# Patient Record
Sex: FEMALE | Race: WHITE | NOT HISPANIC OR LATINO | Employment: OTHER | URBAN - METROPOLITAN AREA
[De-identification: names, ages, dates, MRNs, and addresses within clinical notes are randomized per-mention and may not be internally consistent; named-entity substitution may affect disease eponyms.]

---

## 2017-02-20 PROBLEM — Z98.890 H/O ABDOMINOPLASTY: Status: ACTIVE | Noted: 2017-02-20

## 2017-02-20 PROBLEM — Z29.89 OSTEOPOROSIS PROPHYLAXIS: Status: ACTIVE | Noted: 2017-02-20

## 2017-02-20 PROBLEM — M46.40 DISCITIS: Status: ACTIVE | Noted: 2017-02-20

## 2017-02-20 PROBLEM — Z29.8 OSTEOPOROSIS PROPHYLAXIS: Status: ACTIVE | Noted: 2017-02-20

## 2017-02-20 PROBLEM — R30.0 DYSURIA: Status: ACTIVE | Noted: 2017-02-20

## 2017-02-24 ENCOUNTER — OFFICE VISIT (OUTPATIENT)
Dept: GYNECOLOGIC ONCOLOGY | Facility: CLINIC | Age: 52
End: 2017-02-24

## 2017-02-24 VITALS
HEART RATE: 73 BPM | SYSTOLIC BLOOD PRESSURE: 139 MMHG | RESPIRATION RATE: 16 BRPM | TEMPERATURE: 98.3 F | WEIGHT: 181 LBS | DIASTOLIC BLOOD PRESSURE: 79 MMHG | BODY MASS INDEX: 27.52 KG/M2 | OXYGEN SATURATION: 100 %

## 2017-02-24 DIAGNOSIS — N81.11 MIDLINE CYSTOCELE: Primary | ICD-10-CM

## 2017-02-24 DIAGNOSIS — Z87.39 HISTORY OF OSTEOMYELITIS: ICD-10-CM

## 2017-02-24 DIAGNOSIS — N81.6 RECTOCELE: ICD-10-CM

## 2017-02-24 PROCEDURE — 99213 OFFICE O/P EST LOW 20 MIN: CPT | Performed by: NURSE PRACTITIONER

## 2017-02-24 NOTE — PROGRESS NOTES
"GYN ONCOLOGY FOLLOW-UP    Whit Davis  [id number]  1965    Chief Complaint: Follow-up (prolapse )        History of present illness:  Whit Davis is a 51 y.o. year old female who is here today for c/o recurrent prolapse. She initiated care with our group during an inpatient hospital stay with diagnosis of osteomyelitis at L5S1 following an ASC performed at [facility]. She recovered well after long-term antibiotics managed by ID and as of 2015 CT scan revealed improvement of pelvic findings. She chose to continue her care with our office and was doing well as of her annual exam in 2016.   She presents today with report of prolapse returning, which she first noticed 2-3 months ago. Since that time it has been progressively worsening and she is now feeling tissue protruding through the vagina. She states \"this is worse than it was before my last surgery.\" She has anxiety about this condition due to the complications she had previously. She denies fecal incontinence and only has urinary incontinence when her bladder is allowed to become very full. She reports she has gotten in the habit of emptying very frequently to avoid leaking and avoid increased vaginal pressure. She feels tissue bulging out of the vagina after standing on her feet for an extended period of time and each time she sits to use the restroom. 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 She has questions on how to proceed next.       Past Medical History   Diagnosis Date   • GERD (gastroesophageal reflux disease)    • Internal hemorrhoids    • Meningitis    • MRSA (methicillin resistant Staphylococcus aureus)    • Osteomyelitis      L5S1 following ASC   • RA (rheumatoid arthritis)    • Viral meningitis        Past Surgical History   Procedure Laterality Date   • Abdominoplasty     • Hemorrhoidectomy     •  section       x1   • " Robotic assisted hysterectomy       RTLH/BSO for benign uterine tumor   • Sacrocolpopexy       abdominal approach   • Oophorectomy     • Cholecystectomy N/A 9/22/2016     Procedure: CHOLECYSTECTOMY LAPAROSCOPIC, WITH IOC;  Surgeon: Yaakov Resendez MD;  Location: [facility];  Service:    • Other surgical history  06/2015     Bladder surgery with suspecison and repair   • Hysterectomy       total abdominal       MEDICATIONS: The current medication list was reviewed and reconciled.     Allergies:  is allergic to bactrim [sulfamethoxazole-trimethoprim].    Family History   Problem Relation Age of Onset   • Heart disease Mother    • BRCA 1/2 Neg Hx    • Breast cancer Neg Hx    • Ovarian cancer Neg Hx        Review of Systems   Constitutional: Negative for appetite change, chills, fatigue, fever and unexpected weight change.   Respiratory: Negative for cough, shortness of breath and wheezing.    Cardiovascular: Negative for chest pain, palpitations and leg swelling.   Gastrointestinal: Negative for abdominal distention, abdominal pain, blood in stool, constipation, diarrhea, nausea and vomiting.   Endocrine: Negative.    Genitourinary:        See HPI   Musculoskeletal: Negative for arthralgias, gait problem and joint swelling.   Neurological: Negative for dizziness, seizures, syncope, weakness, light-headedness, numbness and headaches.   Hematological: Negative for adenopathy.   Psychiatric/Behavioral: Negative.        Physical Exam  Vital Signs: Visit Vitals   • /79   • Pulse 73   • Temp 98.3 °F (36.8 °C) (Temporal Artery )   • Resp 16   • Wt 181 lb (82.1 kg)   • SpO2 100%   • BMI 27.52 kg/m2      General Appearance:  alert, cooperative, no apparent distress, appears stated age and normal weight   Neurologic/Psychiatric: A&O x 3, gait steady, appropriate affect   HEENT:  Normocephalic, without obvious abnormality, mucous membranes moist   Abdomen:   Soft, non-tender, non-distended and no organomegaly   Lymph  nodes: No cervical, supraclavicular, inguinal or axillary adenopathy noted   Extremities: Normal, atraumatic; no clubbing, cyanosis, or edema    Pelvic: External Genitalia  without lesions or skin changes  Vagina  is pink, moist, without lesions.  and reveals significant prolapse.  Prior to speculum exam, marked bulging of tissues can be seen with valsalva. Vaginal side walls are relatively well supported. Grade 3 rectocele and grade 3 cystocele both appreciated upon single valve speculum exam. This worsen with valsalva.   Vaginal Cuff  Female Vaginal Cuff: smooth, intact, without visible lesions and cuff is relatively well supported despite prolapses  Uterus  surgically absent  Ovaries  without palpable masses or fullness  Parametria  smooth  Rectovaginal  Female rectovaginal: deferred and perineal body is intact       Procedure Notes:  No notes on file    Assessment and Plan:    Whit was seen today for follow-up.    Diagnoses and all orders for this visit:    Midline cystocele    Rectocele    History of osteomyelitis        Whit and I discussed my findings upon her physical exam are consistent with her report. We discussed conservative (pessary) management vs. Repeat surgical management. Pessary management would likely be temporary and she would likely require Gellhorn type or similar for rectocele support. These can be more difficult to remove and manage at home. She declines this option at this time.   She is most interested in a more permanent solution, although she has anxiety about repeating surgery. To our knowledge there has never been a posterior repair and this is a major component of her current symptoms. Informed her I will speak with Dr. Salazar and Dr. Ruiz about possible surgical repair. I will call her early next week to notify her of our discussion. If they agree, we will bring her back into clinic soon for surgical consultation. She agrees to this plan and v/u.       Joytsna Woodard  ELI Hernandez      Note: Speech recognition transcription software was used to dictate portions of this document.  An attempt at proofreading has been made though minor errors in transcription may still be present.  Please do not hesitate to call our office with any questions.

## 2017-02-27 ENCOUNTER — OFFICE VISIT (OUTPATIENT)
Dept: GYNECOLOGIC ONCOLOGY | Facility: CLINIC | Age: 52
End: 2017-02-27

## 2017-02-27 ENCOUNTER — PREP FOR SURGERY (OUTPATIENT)
Dept: GYNECOLOGIC ONCOLOGY | Facility: CLINIC | Age: 52
End: 2017-02-27

## 2017-02-27 VITALS
RESPIRATION RATE: 16 BRPM | WEIGHT: 182 LBS | HEART RATE: 77 BPM | HEIGHT: 68 IN | BODY MASS INDEX: 27.58 KG/M2 | OXYGEN SATURATION: 98 % | DIASTOLIC BLOOD PRESSURE: 80 MMHG | TEMPERATURE: 98.6 F | SYSTOLIC BLOOD PRESSURE: 156 MMHG

## 2017-02-27 DIAGNOSIS — N81.6 RECTOCELE: Primary | ICD-10-CM

## 2017-02-27 DIAGNOSIS — N81.6 PROCTOCELE: Primary | ICD-10-CM

## 2017-02-27 PROCEDURE — 99212 OFFICE O/P EST SF 10 MIN: CPT | Performed by: OBSTETRICS & GYNECOLOGY

## 2017-02-27 RX ORDER — PREGABALIN 25 MG/1
150 CAPSULE ORAL ONCE
Status: CANCELLED | OUTPATIENT
Start: 2017-02-27 | End: 2017-02-27

## 2017-02-27 RX ORDER — ACETAMINOPHEN 325 MG/1
650 TABLET ORAL ONCE
Status: CANCELLED | OUTPATIENT
Start: 2017-02-27 | End: 2017-02-27

## 2017-02-27 RX ORDER — SODIUM CHLORIDE 0.9 % (FLUSH) 0.9 %
1-10 SYRINGE (ML) INJECTION AS NEEDED
Status: CANCELLED | OUTPATIENT
Start: 2017-02-27

## 2017-02-27 NOTE — PROGRESS NOTES
Progress note:  Patient is a 51-year-old white female who comes in today for evaluation of vaginal wall prolapse.  Significant interval past history is a prior abdominal sacral colpopexy done elsewhere that resulted in sacral osteomyelitis.  This has been treated and she is asymptomatic at this point.  Significant is over the last several months she has noticed increased vaginal bulging through the introitus currently as she stands throughout the day teaching school.  This is getting to the point where it is affecting her daily activities.  She relates that she does have some more urinary frequency but no particular unless she allows her bladder to become overfilled.  She so Jyotsna on 2/24/17 and she noted what she felt to be a significant loss of vaginal wall support.  She reports that this symptomatology is progressive.    Pelvic examination:  External: With Valsalva there is separation of the introitus with bulging of the vaginal mucosa out past the hymeneal ring  Vagina: The vaginal mucosa is intact.  There is no excoriation there is no bleeding there is no discharge.  Evaluation of the vaginal  support reveals a well suspended anterior vaginal wall with no real cystocele.  The urethrovesical angle is well supported.  Posteriorly however there is a grade 3 loss of posterior wall support in the midline starting in the distal third and extending up the proximal third of the posterior vaginal wall.  This is seems to be consistent with a pure rectocele.    Impression:  Grade 3 midline lower vagina rectocele in an otherwise healthy woman.    Plan:  Posterior rectocele repair tentatively scheduled 3/9/2017

## 2017-02-28 ENCOUNTER — PREP FOR SURGERY (OUTPATIENT)
Dept: GYNECOLOGIC ONCOLOGY | Facility: CLINIC | Age: 52
End: 2017-02-28

## 2017-02-28 NOTE — PATIENT INSTRUCTIONS
Gynecologic Oncology  Inpatient Pre-op Patient Education  *See checked boxes for your instructions*    Patient Name:  Whit Davis  [id number]  1965    Surgeon:  Dr. Salazar    Appointment  [x]  1. Your surgery has been scheduled on 3-9-17. You will need to be at the second floor surgery registration of the [facility] hospital on that day at 6:30 AM.   [x] 2.  You have a pre-admission testing (PAT) appointment for labs and possibly chest xray and EKG, on 3-8-17 at 4:30 PM.  You will need to be at hospital registration on the first floor, 10 minutes before that time.  If your PAT appointment is on Sunday, please enter through the Emergency Department.   [x] 3.  The hospital registration department is located in the long hallway between the [address] buildings.     The Day(s) Before Surgery  [x] 1. On 3-8-17, the day prior to surgery, you will need to follow a clear liquid diet which consists of apple, grape, or cranberry juice, coffee, tea, Jell-O, broth, popsicles, and pina. NO MILK, CREAM, OR ORANGE JUICE.  No solid food after midnight on 3-7-17.   You may have sips of clear fluids up until two-three hours prior to your arrival to the hospital on the morning of surgery.    [] 2.  You will need to use a mild laxative at least once (either in the morning or afternoon) the day prior to surgery to clean out your bowels.  You can purchase Miralax over-the-counter at the pharmacy and follow the directions on the back.      [x] 3.  Do not take vitamins or full dose aspirin one week before surgery.  If you normally take a blood thinning medication such as Warfarin, Eliquis, or Xarelto, we will give you specific instructions regarding these medications and we may need to talk with your other doctors.   [x] 4.  On the morning of your surgery, you may likely take your routine prescription medications with a sip of water as reviewed with you by your surgeon.  Bring your home medications with you to the hospital as we  may need to reference these.   In particular be sure to bring any inhalers.     Post-surgery Instructions  [x] 1.  A normal length of stay for your type of surgery is approximately 2-3 hospital nights.  All rooms are private, so family member may stay with you.     [x] 2.  Do not take your own home prescription medication while you are in the hospital unless otherwise instructed.  These will be provided to you.         Comments:

## 2017-03-07 ENCOUNTER — APPOINTMENT (OUTPATIENT)
Dept: PREADMISSION TESTING | Facility: HOSPITAL | Age: 52
End: 2017-03-07

## 2017-03-08 ENCOUNTER — ANESTHESIA EVENT (OUTPATIENT)
Dept: PERIOP | Facility: HOSPITAL | Age: 52
End: 2017-03-08

## 2017-03-08 ENCOUNTER — APPOINTMENT (OUTPATIENT)
Dept: PREADMISSION TESTING | Facility: HOSPITAL | Age: 52
End: 2017-03-08

## 2017-03-08 ENCOUNTER — TELEPHONE (OUTPATIENT)
Dept: GYNECOLOGIC ONCOLOGY | Facility: CLINIC | Age: 52
End: 2017-03-08

## 2017-03-08 VITALS — WEIGHT: 182.76 LBS | HEIGHT: 68 IN | BODY MASS INDEX: 27.7 KG/M2

## 2017-03-08 DIAGNOSIS — N81.6 RECTOCELE: ICD-10-CM

## 2017-03-08 LAB
ALBUMIN SERPL-MCNC: 4.3 G/DL (ref 3.2–4.8)
ALBUMIN/GLOB SERPL: 1.7 G/DL (ref 1.5–2.5)
ALP SERPL-CCNC: 65 U/L (ref 25–100)
ALT SERPL W P-5'-P-CCNC: 34 U/L (ref 7–40)
ANION GAP SERPL CALCULATED.3IONS-SCNC: 4 MMOL/L (ref 3–11)
AST SERPL-CCNC: 47 U/L (ref 0–33)
BASOPHILS # BLD AUTO: 0.04 10*3/MM3 (ref 0–0.2)
BASOPHILS NFR BLD AUTO: 0.8 % (ref 0–1)
BILIRUB SERPL-MCNC: 1.4 MG/DL (ref 0.3–1.2)
BUN BLD-MCNC: 14 MG/DL (ref 9–23)
BUN/CREAT SERPL: 17.5 (ref 7–25)
CALCIUM SPEC-SCNC: 9.8 MG/DL (ref 8.7–10.4)
CHLORIDE SERPL-SCNC: 104 MMOL/L (ref 99–109)
CO2 SERPL-SCNC: 32 MMOL/L (ref 20–31)
CREAT BLD-MCNC: 0.8 MG/DL (ref 0.6–1.3)
DEPRECATED RDW RBC AUTO: 50.2 FL (ref 37–54)
EOSINOPHIL # BLD AUTO: 0.17 10*3/MM3 (ref 0.1–0.3)
EOSINOPHIL NFR BLD AUTO: 3.4 % (ref 0–3)
ERYTHROCYTE [DISTWIDTH] IN BLOOD BY AUTOMATED COUNT: 14.8 % (ref 11.3–14.5)
GFR SERPL CREATININE-BSD FRML MDRD: 75 ML/MIN/1.73
GLOBULIN UR ELPH-MCNC: 2.5 GM/DL
GLUCOSE BLD-MCNC: 90 MG/DL (ref 70–100)
HBA1C MFR BLD: 5 % (ref 4.8–5.6)
HCT VFR BLD AUTO: 37.7 % (ref 34.5–44)
HGB BLD-MCNC: 12.5 G/DL (ref 11.5–15.5)
IMM GRANULOCYTES # BLD: 0.01 10*3/MM3 (ref 0–0.03)
IMM GRANULOCYTES NFR BLD: 0.2 % (ref 0–0.6)
LYMPHOCYTES # BLD AUTO: 1.59 10*3/MM3 (ref 0.6–4.8)
LYMPHOCYTES NFR BLD AUTO: 32.2 % (ref 24–44)
MCH RBC QN AUTO: 30.3 PG (ref 27–31)
MCHC RBC AUTO-ENTMCNC: 33.2 G/DL (ref 32–36)
MCV RBC AUTO: 91.5 FL (ref 80–99)
MONOCYTES # BLD AUTO: 0.44 10*3/MM3 (ref 0–1)
MONOCYTES NFR BLD AUTO: 8.9 % (ref 0–12)
MRSA DNA SPEC QL NAA+PROBE: NEGATIVE
NEUTROPHILS # BLD AUTO: 2.69 10*3/MM3 (ref 1.5–8.3)
NEUTROPHILS NFR BLD AUTO: 54.5 % (ref 41–71)
PLATELET # BLD AUTO: 203 10*3/MM3 (ref 150–450)
PMV BLD AUTO: 9.6 FL (ref 6–12)
POTASSIUM BLD-SCNC: 4.2 MMOL/L (ref 3.5–5.5)
PROT SERPL-MCNC: 6.8 G/DL (ref 5.7–8.2)
RBC # BLD AUTO: 4.12 10*6/MM3 (ref 3.89–5.14)
SODIUM BLD-SCNC: 140 MMOL/L (ref 132–146)
WBC NRBC COR # BLD: 4.94 10*3/MM3 (ref 3.5–10.8)

## 2017-03-08 PROCEDURE — 93010 ELECTROCARDIOGRAM REPORT: CPT | Performed by: INTERNAL MEDICINE

## 2017-03-08 RX ORDER — VANCOMYCIN HYDROCHLORIDE
15 ONCE
Status: SHIPPED | OUTPATIENT
Start: 2017-03-08

## 2017-03-08 RX ORDER — AZITHROMYCIN 250 MG/1
TABLET, FILM COATED ORAL
COMMUNITY
Start: 2017-02-21 | End: 2017-03-08

## 2017-03-08 RX ORDER — PANTOPRAZOLE SODIUM 40 MG/1
TABLET, DELAYED RELEASE ORAL
Refills: 3 | COMMUNITY
Start: 2017-01-09 | End: 2017-03-08

## 2017-03-08 NOTE — PAT
Change pre-op antibiotic to vancomycin per harleen (office) for HX of MRSA- order for MRSA PCR also obtained from Harleen and collected in PAT

## 2017-03-08 NOTE — TELEPHONE ENCOUNTER
Received call from Whit with [facility] PAT Dept. Pt is scheduled for surgery tomorrow with Dr. Salazar. States pt has hx of MRSA, wants to know if Dr. Salazar wants to change abx to Vancomycin.  Instructed her per Dr. Salazar verbal order to do so and do MRSA swab.  Jacqueline hurtado/malena and will do as instructed.

## 2017-03-09 ENCOUNTER — HOSPITAL ENCOUNTER (OUTPATIENT)
Facility: HOSPITAL | Age: 52
Discharge: HOME | End: 2017-03-10
Attending: OBSTETRICS & GYNECOLOGY | Admitting: OBSTETRICS & GYNECOLOGY

## 2017-03-09 ENCOUNTER — ANESTHESIA (OUTPATIENT)
Dept: PERIOP | Facility: HOSPITAL | Age: 52
End: 2017-03-09

## 2017-03-09 DIAGNOSIS — N81.6 RECTOCELE: ICD-10-CM

## 2017-03-09 PROCEDURE — 25010000002 PROPOFOL 10 MG/ML EMULSION: Performed by: NURSE ANESTHETIST, CERTIFIED REGISTERED

## 2017-03-09 PROCEDURE — 25010000002 DEXAMETHASONE PER 1 MG: Performed by: NURSE ANESTHETIST, CERTIFIED REGISTERED

## 2017-03-09 PROCEDURE — 25010000002 NEOSTIGMINE 10 MG/10ML SOLUTION: Performed by: NURSE ANESTHETIST, CERTIFIED REGISTERED

## 2017-03-09 PROCEDURE — 94799 UNLISTED PULMONARY SVC/PX: CPT

## 2017-03-09 PROCEDURE — 57268 REPAIR OF BOWEL BULGE: CPT | Performed by: OBSTETRICS & GYNECOLOGY

## 2017-03-09 PROCEDURE — 25010000002 FENTANYL CITRATE (PF) 100 MCG/2ML SOLUTION: Performed by: NURSE ANESTHETIST, CERTIFIED REGISTERED

## 2017-03-09 PROCEDURE — G0378 HOSPITAL OBSERVATION PER HR: HCPCS

## 2017-03-09 PROCEDURE — 25010000002 DIPHENHYDRAMINE PER 50 MG: Performed by: OBSTETRICS & GYNECOLOGY

## 2017-03-09 PROCEDURE — 25010000002 VANCOMYCIN HCL IN NACL 1.25-0.9 GM/250ML-% SOLUTION: Performed by: OBSTETRICS & GYNECOLOGY

## 2017-03-09 PROCEDURE — 25010000002 ONDANSETRON PER 1 MG: Performed by: NURSE ANESTHETIST, CERTIFIED REGISTERED

## 2017-03-09 PROCEDURE — S0260 H&P FOR SURGERY: HCPCS | Performed by: OBSTETRICS & GYNECOLOGY

## 2017-03-09 RX ORDER — PROPOFOL 10 MG/ML
VIAL (ML) INTRAVENOUS AS NEEDED
Status: DISCONTINUED | OUTPATIENT
Start: 2017-03-09 | End: 2017-03-09 | Stop reason: SURG

## 2017-03-09 RX ORDER — SODIUM CHLORIDE 9 MG/ML
INJECTION, SOLUTION INTRAVENOUS AS NEEDED
Status: DISCONTINUED | OUTPATIENT
Start: 2017-03-09 | End: 2017-03-09 | Stop reason: HOSPADM

## 2017-03-09 RX ORDER — GLYCOPYRROLATE 0.2 MG/ML
INJECTION INTRAMUSCULAR; INTRAVENOUS AS NEEDED
Status: DISCONTINUED | OUTPATIENT
Start: 2017-03-09 | End: 2017-03-09 | Stop reason: SURG

## 2017-03-09 RX ORDER — FAMOTIDINE 20 MG/1
20 TABLET, FILM COATED ORAL ONCE
Status: COMPLETED | OUTPATIENT
Start: 2017-03-09 | End: 2017-03-09

## 2017-03-09 RX ORDER — FENTANYL CITRATE 50 UG/ML
50 INJECTION, SOLUTION INTRAMUSCULAR; INTRAVENOUS
Status: DISCONTINUED | OUTPATIENT
Start: 2017-03-09 | End: 2017-03-09 | Stop reason: HOSPADM

## 2017-03-09 RX ORDER — IBUPROFEN 600 MG/1
600 TABLET ORAL EVERY 6 HOURS PRN
Status: DISCONTINUED | OUTPATIENT
Start: 2017-03-09 | End: 2017-03-10 | Stop reason: HOSPADM

## 2017-03-09 RX ORDER — PROMETHAZINE HYDROCHLORIDE 25 MG/1
25 SUPPOSITORY RECTAL ONCE AS NEEDED
Status: DISCONTINUED | OUTPATIENT
Start: 2017-03-09 | End: 2017-03-09 | Stop reason: HOSPADM

## 2017-03-09 RX ORDER — LABETALOL HYDROCHLORIDE 5 MG/ML
5 INJECTION, SOLUTION INTRAVENOUS
Status: DISCONTINUED | OUTPATIENT
Start: 2017-03-09 | End: 2017-03-09 | Stop reason: HOSPADM

## 2017-03-09 RX ORDER — PROMETHAZINE HYDROCHLORIDE 25 MG/1
25 TABLET ORAL ONCE AS NEEDED
Status: DISCONTINUED | OUTPATIENT
Start: 2017-03-09 | End: 2017-03-09 | Stop reason: HOSPADM

## 2017-03-09 RX ORDER — ROCURONIUM BROMIDE 10 MG/ML
INJECTION, SOLUTION INTRAVENOUS AS NEEDED
Status: DISCONTINUED | OUTPATIENT
Start: 2017-03-09 | End: 2017-03-09 | Stop reason: SURG

## 2017-03-09 RX ORDER — LIDOCAINE HYDROCHLORIDE 10 MG/ML
1 INJECTION, SOLUTION EPIDURAL; INFILTRATION; INTRACAUDAL; PERINEURAL ONCE
Status: COMPLETED | OUTPATIENT
Start: 2017-03-09 | End: 2017-03-09

## 2017-03-09 RX ORDER — OXYCODONE HYDROCHLORIDE 5 MG/1
5 TABLET ORAL EVERY 4 HOURS PRN
Status: DISCONTINUED | OUTPATIENT
Start: 2017-03-09 | End: 2017-03-10 | Stop reason: HOSPADM

## 2017-03-09 RX ORDER — FAMOTIDINE 10 MG/ML
20 INJECTION, SOLUTION INTRAVENOUS ONCE
Status: DISCONTINUED | OUTPATIENT
Start: 2017-03-09 | End: 2017-03-09

## 2017-03-09 RX ORDER — CLINDAMYCIN PHOSPHATE 300 MG/50ML
300 INJECTION INTRAVENOUS ONCE
Status: COMPLETED | OUTPATIENT
Start: 2017-03-09 | End: 2017-03-09

## 2017-03-09 RX ORDER — PANTOPRAZOLE SODIUM 40 MG/1
40 TABLET, DELAYED RELEASE ORAL DAILY
Status: DISCONTINUED | OUTPATIENT
Start: 2017-03-09 | End: 2017-03-10 | Stop reason: HOSPADM

## 2017-03-09 RX ORDER — LIDOCAINE HYDROCHLORIDE 10 MG/ML
INJECTION, SOLUTION EPIDURAL; INFILTRATION; INTRACAUDAL; PERINEURAL AS NEEDED
Status: DISCONTINUED | OUTPATIENT
Start: 2017-03-09 | End: 2017-03-09 | Stop reason: SURG

## 2017-03-09 RX ORDER — SODIUM CHLORIDE, SODIUM LACTATE, POTASSIUM CHLORIDE, CALCIUM CHLORIDE 600; 310; 30; 20 MG/100ML; MG/100ML; MG/100ML; MG/100ML
9 INJECTION, SOLUTION INTRAVENOUS CONTINUOUS
Status: DISCONTINUED | OUTPATIENT
Start: 2017-03-09 | End: 2017-03-09 | Stop reason: HOSPADM

## 2017-03-09 RX ORDER — PROMETHAZINE HYDROCHLORIDE 25 MG/ML
6.25 INJECTION, SOLUTION INTRAMUSCULAR; INTRAVENOUS ONCE AS NEEDED
Status: DISCONTINUED | OUTPATIENT
Start: 2017-03-09 | End: 2017-03-09 | Stop reason: HOSPADM

## 2017-03-09 RX ORDER — ACETAMINOPHEN 325 MG/1
650 TABLET ORAL ONCE
Status: COMPLETED | OUTPATIENT
Start: 2017-03-09 | End: 2017-03-09

## 2017-03-09 RX ORDER — HYDROXYCHLOROQUINE SULFATE 200 MG/1
200 TABLET, FILM COATED ORAL 2 TIMES DAILY
Status: DISCONTINUED | OUTPATIENT
Start: 2017-03-09 | End: 2017-03-10 | Stop reason: HOSPADM

## 2017-03-09 RX ORDER — SODIUM CHLORIDE 0.9 % (FLUSH) 0.9 %
1-10 SYRINGE (ML) INJECTION AS NEEDED
Status: DISCONTINUED | OUTPATIENT
Start: 2017-03-09 | End: 2017-03-09 | Stop reason: HOSPADM

## 2017-03-09 RX ORDER — SODIUM CHLORIDE 0.9 % (FLUSH) 0.9 %
1-10 SYRINGE (ML) INJECTION AS NEEDED
Status: DISCONTINUED | OUTPATIENT
Start: 2017-03-09 | End: 2017-03-09

## 2017-03-09 RX ORDER — ONDANSETRON 2 MG/ML
4 INJECTION INTRAMUSCULAR; INTRAVENOUS ONCE AS NEEDED
Status: DISCONTINUED | OUTPATIENT
Start: 2017-03-09 | End: 2017-03-09 | Stop reason: HOSPADM

## 2017-03-09 RX ORDER — ONDANSETRON 2 MG/ML
INJECTION INTRAMUSCULAR; INTRAVENOUS AS NEEDED
Status: DISCONTINUED | OUTPATIENT
Start: 2017-03-09 | End: 2017-03-09 | Stop reason: SURG

## 2017-03-09 RX ORDER — HYDROCODONE BITARTRATE AND ACETAMINOPHEN 5; 325 MG/1; MG/1
1 TABLET ORAL ONCE AS NEEDED
Status: DISCONTINUED | OUTPATIENT
Start: 2017-03-09 | End: 2017-03-09 | Stop reason: HOSPADM

## 2017-03-09 RX ORDER — VANCOMYCIN HYDROCHLORIDE
15 ONCE
Status: DISCONTINUED | OUTPATIENT
Start: 2017-03-09 | End: 2017-03-09 | Stop reason: HOSPADM

## 2017-03-09 RX ORDER — FENTANYL CITRATE 50 UG/ML
INJECTION, SOLUTION INTRAMUSCULAR; INTRAVENOUS AS NEEDED
Status: DISCONTINUED | OUTPATIENT
Start: 2017-03-09 | End: 2017-03-09 | Stop reason: SURG

## 2017-03-09 RX ORDER — MAGNESIUM HYDROXIDE 1200 MG/15ML
LIQUID ORAL AS NEEDED
Status: DISCONTINUED | OUTPATIENT
Start: 2017-03-09 | End: 2017-03-09 | Stop reason: HOSPADM

## 2017-03-09 RX ORDER — DIPHENHYDRAMINE HYDROCHLORIDE 50 MG/ML
25 INJECTION INTRAMUSCULAR; INTRAVENOUS ONCE
Status: COMPLETED | OUTPATIENT
Start: 2017-03-09 | End: 2017-03-09

## 2017-03-09 RX ORDER — SODIUM CHLORIDE, SODIUM LACTATE, POTASSIUM CHLORIDE, CALCIUM CHLORIDE 600; 310; 30; 20 MG/100ML; MG/100ML; MG/100ML; MG/100ML
50 INJECTION, SOLUTION INTRAVENOUS CONTINUOUS
Status: DISCONTINUED | OUTPATIENT
Start: 2017-03-09 | End: 2017-03-10 | Stop reason: HOSPADM

## 2017-03-09 RX ORDER — NEOSTIGMINE METHYLSULFATE 1 MG/ML
INJECTION, SOLUTION INTRAVENOUS AS NEEDED
Status: DISCONTINUED | OUTPATIENT
Start: 2017-03-09 | End: 2017-03-09 | Stop reason: SURG

## 2017-03-09 RX ORDER — PREGABALIN 75 MG/1
150 CAPSULE ORAL ONCE
Status: COMPLETED | OUTPATIENT
Start: 2017-03-09 | End: 2017-03-09

## 2017-03-09 RX ORDER — BUPIVACAINE HYDROCHLORIDE AND EPINEPHRINE 5; 5 MG/ML; UG/ML
INJECTION, SOLUTION PERINEURAL AS NEEDED
Status: DISCONTINUED | OUTPATIENT
Start: 2017-03-09 | End: 2017-03-09 | Stop reason: HOSPADM

## 2017-03-09 RX ORDER — ONDANSETRON 4 MG/1
4 TABLET, FILM COATED ORAL EVERY 6 HOURS PRN
Status: DISCONTINUED | OUTPATIENT
Start: 2017-03-09 | End: 2017-03-10 | Stop reason: HOSPADM

## 2017-03-09 RX ORDER — ONDANSETRON 2 MG/ML
4 INJECTION INTRAMUSCULAR; INTRAVENOUS EVERY 6 HOURS PRN
Status: DISCONTINUED | OUTPATIENT
Start: 2017-03-09 | End: 2017-03-10 | Stop reason: HOSPADM

## 2017-03-09 RX ORDER — DEXAMETHASONE SODIUM PHOSPHATE 4 MG/ML
INJECTION, SOLUTION INTRA-ARTICULAR; INTRALESIONAL; INTRAMUSCULAR; INTRAVENOUS; SOFT TISSUE AS NEEDED
Status: DISCONTINUED | OUTPATIENT
Start: 2017-03-09 | End: 2017-03-09 | Stop reason: SURG

## 2017-03-09 RX ORDER — NALOXONE HCL 0.4 MG/ML
0.4 VIAL (ML) INJECTION AS NEEDED
Status: DISCONTINUED | OUTPATIENT
Start: 2017-03-09 | End: 2017-03-09 | Stop reason: HOSPADM

## 2017-03-09 RX ORDER — DOCUSATE SODIUM 100 MG/1
200 CAPSULE, LIQUID FILLED ORAL 2 TIMES DAILY
Status: DISCONTINUED | OUTPATIENT
Start: 2017-03-09 | End: 2017-03-10 | Stop reason: HOSPADM

## 2017-03-09 RX ADMIN — OXYCODONE HYDROCHLORIDE 5 MG: 5 TABLET ORAL at 20:59

## 2017-03-09 RX ADMIN — PROPOFOL 200 MG: 10 INJECTION, EMULSION INTRAVENOUS at 08:41

## 2017-03-09 RX ADMIN — OXYCODONE HYDROCHLORIDE 5 MG: 5 TABLET ORAL at 13:23

## 2017-03-09 RX ADMIN — PREGABALIN 150 MG: 75 CAPSULE ORAL at 07:00

## 2017-03-09 RX ADMIN — HYDROXYCHLOROQUINE SULFATE 200 MG: 200 TABLET, FILM COATED ORAL at 13:22

## 2017-03-09 RX ADMIN — FAMOTIDINE 20 MG: 20 TABLET ORAL at 07:00

## 2017-03-09 RX ADMIN — ACETAMINOPHEN 650 MG: 325 TABLET, FILM COATED ORAL at 07:00

## 2017-03-09 RX ADMIN — DOCUSATE SODIUM 200 MG: 100 CAPSULE, LIQUID FILLED ORAL at 18:00

## 2017-03-09 RX ADMIN — LIDOCAINE HYDROCHLORIDE 30 MG: 10 INJECTION, SOLUTION EPIDURAL; INFILTRATION; INTRACAUDAL; PERINEURAL at 08:41

## 2017-03-09 RX ADMIN — CLINDAMYCIN PHOSPHATE 300 MG: 6 INJECTION, SOLUTION INTRAVENOUS at 08:34

## 2017-03-09 RX ADMIN — DIPHENHYDRAMINE HYDROCHLORIDE 25 MG: 50 INJECTION INTRAMUSCULAR; INTRAVENOUS at 08:26

## 2017-03-09 RX ADMIN — PANTOPRAZOLE SODIUM 40 MG: 40 TABLET, DELAYED RELEASE ORAL at 12:28

## 2017-03-09 RX ADMIN — ROBINUL 0.4 MG: 0.2 INJECTION INTRAMUSCULAR; INTRAVENOUS at 09:36

## 2017-03-09 RX ADMIN — LIDOCAINE HYDROCHLORIDE 0.2 ML: 10 INJECTION, SOLUTION EPIDURAL; INFILTRATION; INTRACAUDAL; PERINEURAL at 06:55

## 2017-03-09 RX ADMIN — SODIUM CHLORIDE, POTASSIUM CHLORIDE, SODIUM LACTATE AND CALCIUM CHLORIDE 50 ML/HR: 600; 310; 30; 20 INJECTION, SOLUTION INTRAVENOUS at 15:26

## 2017-03-09 RX ADMIN — DEXAMETHASONE SODIUM PHOSPHATE 8 MG: 4 INJECTION, SOLUTION INTRAMUSCULAR; INTRAVENOUS at 08:57

## 2017-03-09 RX ADMIN — HYDROXYCHLOROQUINE SULFATE 200 MG: 200 TABLET, FILM COATED ORAL at 20:51

## 2017-03-09 RX ADMIN — DOCUSATE SODIUM 200 MG: 100 CAPSULE, LIQUID FILLED ORAL at 12:28

## 2017-03-09 RX ADMIN — SODIUM CHLORIDE, POTASSIUM CHLORIDE, SODIUM LACTATE AND CALCIUM CHLORIDE 9 ML/HR: 600; 310; 30; 20 INJECTION, SOLUTION INTRAVENOUS at 06:55

## 2017-03-09 RX ADMIN — FENTANYL CITRATE 100 MCG: 50 INJECTION, SOLUTION INTRAMUSCULAR; INTRAVENOUS at 08:41

## 2017-03-09 RX ADMIN — ROCURONIUM BROMIDE 30 MG: 10 INJECTION INTRAVENOUS at 08:41

## 2017-03-09 RX ADMIN — NEOSTIGMINE METHYLSULFATE 3 MG: 1 INJECTION, SOLUTION INTRAVENOUS at 09:36

## 2017-03-09 RX ADMIN — ONDANSETRON 4 MG: 2 INJECTION INTRAMUSCULAR; INTRAVENOUS at 09:36

## 2017-03-09 NOTE — PLAN OF CARE
Problem: Patient Care Overview (Adult)  Goal: Plan of Care Review  Outcome: Ongoing (interventions implemented as appropriate)    03/09/17 1756   Coping/Psychosocial Response Interventions   Plan Of Care Reviewed With patient;family       Goal: Adult Individualization and Mutuality  Outcome: Ongoing (interventions implemented as appropriate)  Goal: Discharge Needs Assessment  Outcome: Ongoing (interventions implemented as appropriate)    Problem: Skin Integrity Impairment, Risk/Actual (Adult)  Intervention: Promote/Optimize Nutrition    03/09/17 1816   Hygiene Care Assistance   Oral Care lip lubricant applied       Intervention: Prevent/Manage Excess Moisture    03/09/17 1816   Hygiene Care Assistance   Perineal Care perianal area cleansed       Intervention: Prevent/Minimize Sheer/Friction Injuries    03/09/17 1700   Positioning   Positioning/Transfer Devices pillows         Goal: Identify Related Risk Factors and Signs and Symptoms  Outcome: Ongoing (interventions implemented as appropriate)    03/09/17 1816   Skin Integrity Impairment, Risk/Actual   Skin Integrity Impairment, Risk/Actual: Related Risk Factors surgery/procedure       Goal: Skin Integrity/Wound Healing  Outcome: Ongoing (interventions implemented as appropriate)    03/09/17 1816   Skin Integrity Impairment, Risk/Actual (Adult)   Skin Integrity/Wound Healing making progress toward outcome

## 2017-03-09 NOTE — ANESTHESIA PREPROCEDURE EVALUATION
Anesthesia Evaluation     Patient summary reviewed and Nursing notes reviewed   history of anesthetic complications (mild ponv): PONV     Airway   Mallampati: I  TM distance: >3 FB  Neck ROM: full  no difficulty expected  Dental      Pulmonary    Cardiovascular         Neuro/Psych  GI/Hepatic/Renal/Endo      Musculoskeletal     Abdominal    Substance History      OB/GYN          Other                                  Anesthesia Plan    ASA 1     general     intravenous induction   Anesthetic plan and risks discussed with patient.    Plan discussed with CRNA.

## 2017-03-09 NOTE — OP NOTE
POSTERIOR VAGINAL REPAIR  Procedure Note    Whit Davis  3/9/2017    Pre-op Diagnosis:   Rectocele [N81.6]    Post-op Diagnosis:     Post-Op Diagnosis Codes:     * Rectocele [N81.6]  Enterocele    Procedure(s):  POSTERIOR VAGINAL REPAIR  Enterocele repair    Surgeon(s):  Baron Salaazr MD    Assistant:  Alysia Singh MD  Resident    Anesthesia: General    Staff:   Circulator: Rosemarie JUNE RN  Scrub Person: Karie Pederson  Nursing Assistant: Elizabeth Dominique    Estimated Blood Loss: *50ml    Specimens:                * No orders in the log *      Drains:   Urethral Catheter 03/09/17 0850 100% silicone 16 10 10 (Active)   Daily Indications < 24 hr post op 3/9/2017  9:51 AM           Findings:   1. Rectocele  2. Enterocele    Complications: None immediate    Procedures: DESCRIPTION OF PROCEDURE: After consent was obtained, the patient was taken to the operating room and underwent general endotracheal anesthesia. The patient was prepped and draped in a normal sterile fashion in the dorsal lithotomy position in [facility].  A timeout was taken. A Celis catheter was anchored.  The posterior vaginal mucosa was injected with quarter percent Marcaine with epinephrine.  A horizontal incision was made at the distal mucosa using a scalpel.  The vaginal mucosa was then undermined and sequentially incised using Metzenbaum scissors to the apex of the vagina.  The dissected mucosa was grasped with Allis clamps.  Mucosa was then further isolated using Metzenbaum scissors.  At this time the enterocele was noted.  The hernia sac was closed using a 3-0 Vicryl in a pursestring fashion.  The endopelvic fascia was then reapproximated using a 2-0 Vicryl and interrupted fashion.  The fascia was also reinforced over the closed hernia sac.  Approximately 1 cm of vaginal mucosa was trimmed on both sides of the incision.  The vaginal mucosa was reapproximated using a 2-0 Vicryl in a running locking fashion.  Good  hemostasis was noted.  A small defect was noted at the apex of the incision on the patient's right.  This was reapproximated using a 2-0 Vicryl in a figure-of-eight fashion.  The vagina was irrigated.  It was then packed with a vaginal packing and Premarin cream.      The patient tolerated this procedure well. Sponge lap and needle counts were correct ×3.  She awoke from general anesthesia uneventfully and was taken to the recovery room in stable condition.    Dr. Salazar was present and scrubbed for all portions of this case.    Case conducted as documented above.      Alysia Singh MD     Date: 3/9/2017  Time: 10:50 AM

## 2017-03-09 NOTE — ADDENDUM NOTE
Addendum  created 03/09/17 0955 by Meir Husain, IRIS    Anesthesia Event edited, Anesthesia Intra Blocks edited, Anesthesia Intra Devices edited, Anesthesia Intra Flowsheets edited, Anesthesia Intra Meds edited, Anesthesia Review and Sign - Signed, Anesthesia Staff edited, Child order released for a procedure order, Flowsheet data copied forward, LDA created via procedure documentation, Order sets accessed, Patient device added, Patient device removed, Procedure Event Log accessed, Sign clinical note

## 2017-03-09 NOTE — H&P
Patient Care Team:      Chief complaint - Rectocele    Subjective:    Patient is a 51 y.o.female presents with history of rectocele  Review of Systems:  General ROS: negative  Cardiovascular ROS: no chest pain or dyspnea on exertion  Respiratory ROS: no cough, shortness of breath, or wheezing      Allergies:   Allergies   Allergen Reactions   • Bactrim [Sulfamethoxazole-Trimethoprim] Rash          Latex: denies  Contrast Dye: denies    Home Meds    Prescriptions Prior to Admission   Medication Sig Dispense Refill Last Dose   • folic acid (FOLVITE) 1 MG tablet Take 4 mg by mouth daily.   3/5/2017 at Unknown time   • hydroxychloroquine (PLAQUENIL) 200 MG tablet Take 200 mg by mouth 2 (two) times a day.   3/5/2017 at Unknown time   • linaclotide (LINZESS) 290 MCG capsule capsule Take 290 mcg by mouth As Needed (constipation).   Past Month at Unknown time   • Multiple Vitamins-Minerals (MULTIVITAMIN PO) Take 1 tablet by mouth daily.   3/5/2017 at Unknown time   • pantoprazole (PROTONIX) 20 MG EC tablet Take 40 mg by mouth Daily.   3/8/2017 at 0700   • piroxicam (FELDENE) 20 MG capsule Take 20 mg by mouth daily.   3/5/2017 at Unknown time     PMH:   Past Medical History   Diagnosis Date   • Internal hemorrhoids    • Meningitis    • MRSA (methicillin resistant Staphylococcus aureus)      abdomen- 14 years ago- sen infectious disease   • Osteomyelitis      L5S1 following ASC   • RA (rheumatoid arthritis)    • Raynaud's disease    • Viral meningitis    • Wears glasses      PSH:    Past Surgical History   Procedure Laterality Date   • Abdominoplasty     • Hemorrhoidectomy     •  section       x1   • Robotic assisted hysterectomy       RTLH/BSO for benign uterine tumor   • Sacrocolpopexy       abdominal approach   • Oophorectomy     • Cholecystectomy N/A 2016     Procedure: CHOLECYSTECTOMY LAPAROSCOPIC, WITH IOC;  Surgeon: Yaakov Resendez MD;  Location: [facility] OR;  Service:    • Other surgical history   "06/2015     Bladder surgery with suspecison and repair   • Hysterectomy       total abdominal   • Achilles tendon repair Left    • Tonsillectomy     • Colonoscopy       2010     Immunization History: pneumo - 10/2016   Flu - 10/2016  Tetanus - pt. unsure   Social History:   Tobacco - denies   Alcohol - denies      Physical Exam:  Visit Vitals   • /85 (BP Location: Right arm, Patient Position: Lying)   • Pulse 55   • Temp 97.8 °F (36.6 °C) (Temporal Artery )   • Resp 16   • Ht 68\" (172.7 cm)   • Wt 182 lb (82.6 kg)   • SpO2 98%   • BMI 27.67 kg/m2         General Appearance:    Alert, cooperative, no distress, appears stated age   Head:    Normocephalic, without obvious abnormality, atraumatic   Lungs:     Clear to auscultation bilaterally, respirations unlabored    Heart:   Regular rate and rhythm, S1 and S2 normal, no murmur, rub    or gallop    Abdomen:    Soft without tenderness   Breast Exam:    deferred   Genitalia:    deferred   Extremities:   Extremities normal, atraumatic, no cyanosis or edema   Skin:   Skin color, texture, turgor normal, no rashes or lesions   Neurologic:   Grossly intact     Cancer Patient: __ yes _x_no __unknown; If yes, clinical stage T:__ N:__M:__, stage group    Impression: Rectocele    Plan: Posterior vaginal repair    KURT Gupta 3/9/2017 7:04 AM      Patient visited. No change in status from outpt note.  Had rxn to vancomycin, rx'd with benedryl 25mg iv  Will give Clindamycin 300mg iv instead    "

## 2017-03-09 NOTE — ANESTHESIA PROCEDURE NOTES
Airway  Urgency: elective    Date/Time: 3/9/2017 8:53 AM  Airway not difficult    General Information and Staff    Patient location during procedure: OR  Anesthesiologist: VALERY DAVISON  CRNA: AVRIL MOORE    Indications and Patient Condition  Indications for airway management: airway protection    Preoxygenated: yes  MILS not maintained throughout  Mask difficulty assessment: 1 - vent by mask    Final Airway Details  Final airway type: endotracheal airway      Successful airway: ETT  Cuffed: yes   Successful intubation technique: direct laryngoscopy  Endotracheal tube insertion site: oral  Blade: South  Blade size: #3  ETT size: 7.5 mm  Cormack-Lehane Classification: grade I - full view of glottis  Placement verified by: chest auscultation and capnometry   Measured from: lips  ETT to lips (cm): 20  Number of attempts at approach: 1    Additional Comments  Negative epigastric sounds, Breath sound equal bilaterally with symmetric chest rise and fall. Atraumatic intubation. Lips, gums, teeth, soft tissue as preop.

## 2017-03-09 NOTE — ANESTHESIA POSTPROCEDURE EVALUATION
"Patient: Whit Davis    Procedure Summary     Date Anesthesia Start Anesthesia Stop Room / Location    03/09/17 0834  [facility] / [facility]       Procedure Diagnosis Surgeon Provider    POSTERIOR VAGINAL REPAIR (N/A Vagina) Rectocele  (Rectocele [N81.6]) MD Héctor Kirk MD          Anesthesia Type: general  Last vitals  BP      Temp      Pulse     Resp      SpO2        Post Anesthesia Care and Evaluation    Patient location during evaluation: PACU  Patient participation: complete - patient participated  Level of consciousness: awake and responsive to verbal stimuli  Pain score: 2  Pain management: adequate  Airway patency: patent  Anesthetic complications: No anesthetic complications    Cardiovascular status: acceptable  Respiratory status: acceptable  Hydration status: acceptable    Comments: Pt awake and responsive. SV. VSS. Report to RN. Patient Vitals in the past 24 hrs:  03/09/17 0658, BP:138/85, Temp:97.8 °F (36.6 °C), Temp src:Temporal Art, Pulse:55, Resp:16, SpO2:98 %, Height:68\" (172.7 cm), Weight:182 lb (82.6 kg)  133/78. p 72. r 16. t 98.1      Post 132/91 sat 99. 53 pulse. 12 resp temp 97           "

## 2017-03-09 NOTE — POST-PROCEDURE NOTE
Postoperative visit:  Patient seen in the rectum postoperatively.  She is awake and alert.  No complaints other than soreness.  Her vital signs are stable.  The conduct of her surgical procedure was reviewed.    Impression: Satisfactory postoperative condition  Plan: Discharge in a.m.

## 2017-03-10 VITALS
HEIGHT: 68 IN | OXYGEN SATURATION: 97 % | RESPIRATION RATE: 18 BRPM | TEMPERATURE: 98.1 F | HEART RATE: 54 BPM | SYSTOLIC BLOOD PRESSURE: 104 MMHG | WEIGHT: 182 LBS | DIASTOLIC BLOOD PRESSURE: 58 MMHG | BODY MASS INDEX: 27.58 KG/M2

## 2017-03-10 LAB
ANION GAP SERPL CALCULATED.3IONS-SCNC: 1 MMOL/L (ref 3–11)
BUN BLD-MCNC: 14 MG/DL (ref 9–23)
BUN/CREAT SERPL: 17.5 (ref 7–25)
CALCIUM SPEC-SCNC: 9.7 MG/DL (ref 8.7–10.4)
CHLORIDE SERPL-SCNC: 103 MMOL/L (ref 99–109)
CO2 SERPL-SCNC: 35 MMOL/L (ref 20–31)
CREAT BLD-MCNC: 0.8 MG/DL (ref 0.6–1.3)
DEPRECATED RDW RBC AUTO: 48.8 FL (ref 37–54)
ERYTHROCYTE [DISTWIDTH] IN BLOOD BY AUTOMATED COUNT: 14.5 % (ref 11.3–14.5)
GFR SERPL CREATININE-BSD FRML MDRD: 75 ML/MIN/1.73
GLUCOSE BLD-MCNC: 90 MG/DL (ref 70–100)
HCT VFR BLD AUTO: 35.4 % (ref 34.5–44)
HGB BLD-MCNC: 11.5 G/DL (ref 11.5–15.5)
MCH RBC QN AUTO: 29.6 PG (ref 27–31)
MCHC RBC AUTO-ENTMCNC: 32.5 G/DL (ref 32–36)
MCV RBC AUTO: 91.2 FL (ref 80–99)
PLATELET # BLD AUTO: 188 10*3/MM3 (ref 150–450)
PMV BLD AUTO: 10.2 FL (ref 6–12)
POTASSIUM BLD-SCNC: 4 MMOL/L (ref 3.5–5.5)
RBC # BLD AUTO: 3.88 10*6/MM3 (ref 3.89–5.14)
SODIUM BLD-SCNC: 139 MMOL/L (ref 132–146)
WBC NRBC COR # BLD: 10.05 10*3/MM3 (ref 3.5–10.8)

## 2017-03-10 PROCEDURE — 80048 BASIC METABOLIC PNL TOTAL CA: CPT | Performed by: OBSTETRICS & GYNECOLOGY

## 2017-03-10 PROCEDURE — 85027 COMPLETE CBC AUTOMATED: CPT | Performed by: OBSTETRICS & GYNECOLOGY

## 2017-03-10 PROCEDURE — 25010000002 ENOXAPARIN PER 10 MG: Performed by: OBSTETRICS & GYNECOLOGY

## 2017-03-10 PROCEDURE — 99024 POSTOP FOLLOW-UP VISIT: CPT | Performed by: OBSTETRICS & GYNECOLOGY

## 2017-03-10 RX ORDER — OXYCODONE HYDROCHLORIDE 5 MG/1
5 TABLET ORAL EVERY 4 HOURS PRN
Qty: 15 TABLET | Refills: 0
Start: 2017-03-10 | End: 2017-03-19

## 2017-03-10 RX ORDER — POLYETHYLENE GLYCOL 3350 17 G/17G
17 POWDER, FOR SOLUTION ORAL DAILY
Qty: 30 EACH | Refills: 0 | Status: SHIPPED | OUTPATIENT
Start: 2017-03-10 | End: 2017-06-21 | Stop reason: SDUPTHER

## 2017-03-10 RX ORDER — ACETAMINOPHEN 325 MG/1
650 TABLET ORAL EVERY 6 HOURS PRN
Qty: 1 BOTTLE | Refills: 1 | Status: SHIPPED | OUTPATIENT
Start: 2017-03-10 | End: 2017-03-24

## 2017-03-10 RX ORDER — PSEUDOEPHEDRINE HCL 30 MG
200 TABLET ORAL 2 TIMES DAILY
Qty: 60 CAPSULE | Refills: 1 | Status: SHIPPED | OUTPATIENT
Start: 2017-03-10 | End: 2017-07-17 | Stop reason: SDUPTHER

## 2017-03-10 RX ADMIN — OXYCODONE HYDROCHLORIDE 5 MG: 5 TABLET ORAL at 10:15

## 2017-03-10 RX ADMIN — ENOXAPARIN SODIUM 40 MG: 40 INJECTION SUBCUTANEOUS at 09:09

## 2017-03-10 RX ADMIN — HYDROXYCHLOROQUINE SULFATE 200 MG: 200 TABLET, FILM COATED ORAL at 09:09

## 2017-03-10 RX ADMIN — DOCUSATE SODIUM 200 MG: 100 CAPSULE, LIQUID FILLED ORAL at 09:09

## 2017-03-10 RX ADMIN — PANTOPRAZOLE SODIUM 40 MG: 40 TABLET, DELAYED RELEASE ORAL at 09:09

## 2017-03-10 NOTE — PLAN OF CARE
Problem: Patient Care Overview (Adult)  Goal: Plan of Care Review  Outcome: Ongoing (interventions implemented as appropriate)    03/10/17 0321   Coping/Psychosocial Response Interventions   Plan Of Care Reviewed With patient   Patient Care Overview   Progress improving   Outcome Evaluation   Outcome Summary/Follow up Plan IV fluids, oral meds tolerated well, fuchs catheter and vaginal packing to be dc this am, patient pending discharge in AM        Goal: Adult Individualization and Mutuality  Outcome: Ongoing (interventions implemented as appropriate)  Goal: Discharge Needs Assessment  Outcome: Ongoing (interventions implemented as appropriate)    Problem: Perioperative Period (Adult)  Goal: Signs and Symptoms of Listed Potential Problems Will be Absent or Manageable (Perioperative Period)  Outcome: Ongoing (interventions implemented as appropriate)

## 2017-03-10 NOTE — PROGRESS NOTES
Gynecologic Oncology   Daily Progress Note    Subjective   Patient did well overnight.  Her pain is controlled.  She is not having nausea or emesis.  She is tolerating a regular diet.  Her Celis catheter was discontinued this morning as well as her vaginal packing and she is awaiting a void.  She is using her incentive spirometer.        Objective   Temp:  [97 °F (36.1 °C)-98.6 °F (37 °C)] 98.3 °F (36.8 °C)  Heart Rate:  [45-56] 56  Resp:  [16-18] 18  BP: (101-164)/(56-91) 108/57  Vitals:    03/10/17 0600   BP: 108/57   Pulse: 56   Resp: 18   Temp: 98.3 °F (36.8 °C)   SpO2: 95%     I/O last 3 completed shifts:  In: 2040 [P.O.:840; I.V.:1200]  Out: 3185 [Urine:3185]     GENERAL: Alert, well-appearing female in no apparent distress.    HEENT: Sclera anicteric, normal eyelids. Head normocephalic, atraumatic. Mucus membranes moist.  Normal dentition.  Trachea midline    CARDIOVASCULAR: Normal rate, regular rhythm, no murmurs, rubs, or gallops.    RESPIRATORY: Clear to auscultation bilaterally, normal respiratory effort  GASTROINTESTINAL:  Soft, appropriately tender, non-distended, no rebound or guarding.  Positive bowel sounds.    GENITOURINARY: Celis previously removed.   SKIN:  Warm, dry, well-perfused.    PSYCHIATRIC: AO x3, with appropriate affect, normal thought processes  MSK/EXTREMITIES:  No digital cyanosis.  Symmetric. No peripheral edema.  +SCDs.    Lab Results   Component Value Date    WBC 10.05 03/10/2017    HGB 11.5 03/10/2017    HCT 35.4 03/10/2017    MCV 91.2 03/10/2017     03/10/2017    NEUTROABS 2.69 03/08/2017    GLUCOSE 90 03/08/2017    BUN 14 03/08/2017    CREATININE 0.80 03/08/2017     03/08/2017    K 4.2 03/08/2017     03/08/2017    CO2 32.0 (H) 03/08/2017    MG 1.8 09/22/2016    CALCIUM 9.8 03/08/2017         Assessment/Plan   Whit Davis is a 51 y.o. female POD1 s/p posterior repair and enterocele repair.    1.  Post-operative care  -Routine care: encourage ambulation, IS  use. saline lock IV. bowel regimen, VTE prophylaxis.  -Awaiting spontaneous void.    2.  FEN/Ppx  -Reg/HLIV  -SCDs, pepcid, Lovenox    3.  Disposition  -Anticipate d/c home today    Ms. Davis had an uneventful postoperative surgery.  Her packing catheter has been removed.  Charge is anticipated as soon as she voids spontaneously.            Alysia Singh MD  03/10/17  7:03 AM

## 2017-03-10 NOTE — DISCHARGE SUMMARY
Gynecologic Oncology   [facility]   Discharge Summary    Date of Admission: 3/9/2017    Date of Discharge:     Admission Diagnoses:    Rectocele [N81.6]  Rectocele [N81.6]   Enterocele      Discharge Diagnoses: Same as above      Hospital Course:  Whit Davis is a 51 y.o. female who presented with bulge symptoms and was noted to have a significant posterior vaginal wall defect concerning for rectocele vs. Enterocele. She desired definitive surgical management.    On 3/9/2017 she was admitted and underwent posterior colporrhaphy with enterocele repair.  Please refer to dictated operative note for further details.  Post-operatively she did well.  Her diet was advanced.    Her POD1 labs showed stable hematocrit.  Chemistries were unremarkable.  By POD1 she was tolerating a general diet, voiding spontaneously, having her pain controlled with oral medications, and otherwise meeting criteria for discharge and she was discharged home in stable condition.    Discharge Medications:   Whit Davis   Home Medication Instructions HANY:[id number]    Printed on:03/10/17 2324   Medication Information                      acetaminophen (TYLENOL) 325 MG tablet  Take 2 tablets by mouth Every 6 (Six) Hours As Needed for Mild Pain (1-3).             conjugated estrogens (PREMARIN) 0.625 MG/GM vaginal cream  Insert  into the vagina Daily.             docusate sodium 100 MG capsule  Take 200 mg by mouth 2 (Two) Times a Day.             folic acid (FOLVITE) 1 MG tablet  Take 4 mg by mouth daily.             hydroxychloroquine (PLAQUENIL) 200 MG tablet  Take 200 mg by mouth 2 (two) times a day.             linaclotide (LINZESS) 290 MCG capsule capsule  Take 290 mcg by mouth As Needed (constipation).             Multiple Vitamins-Minerals (MULTIVITAMIN PO)  Take 1 tablet by mouth daily.             oxyCODONE (ROXICODONE) 5 MG immediate release tablet  Take 1 tablet by mouth Every 4 (Four) Hours As Needed for moderate  pain (4-6) for up to 9 days.             pantoprazole (PROTONIX) 20 MG EC tablet  Take 40 mg by mouth Daily.             piroxicam (FELDENE) 20 MG capsule  Take 20 mg by mouth daily.                 Discharge Instructions:  She was instructed to call or return to medical attention for fever, severe pain, persistent nausea and vomiting, questions regarding medications, concerns regarding her incisions, excessive vaginal bleeding or discharge, or for any other acute concerns.  She was also instructed not to drive while taking narcotic pain medications, to abide by pelvic rest, avoid tub baths, and to avoid heavy lifting for at least 6 weeks.  Patient was educated regarding constipation prevention.  Patient encouraged to use daily MiraLAX.     Condition at Discharge: Stable    Discharge Destination: Home    Results pending at time of discharge: None    Follow Up: 2-3 weeks    Patient was visited.  She has no complaints.  She is ambulatory.  She has met all  parameters for discharge.        Electronically Signed by: Alysia Singh MD  Date: 3/10/2017      Time:  8:10 AM

## 2017-03-17 ENCOUNTER — TELEPHONE (OUTPATIENT)
Dept: GYNECOLOGIC ONCOLOGY | Facility: CLINIC | Age: 52
End: 2017-03-17

## 2017-03-17 NOTE — TELEPHONE ENCOUNTER
Returned pt's call from voice message saying she still had bright red bleeding, very light flow, has pelvic pressure,cramping, no fever, bladder and bowel ok.  She is post op vaginal posterior repair 3-9-17 with Dr. Salazar.  Informed Dr. Salazar, instructed pt per his verbal order to monitor, call if fever, increase in pain not relieved with medication, no improvement in bleeding, or any other problems or concerns she may have, o/w, has appt in clinic with Dr. Salazar 3-24-17.  Pt v/u, will call if needed.

## 2017-03-24 ENCOUNTER — OFFICE VISIT (OUTPATIENT)
Dept: GYNECOLOGIC ONCOLOGY | Facility: CLINIC | Age: 52
End: 2017-03-24

## 2017-03-24 VITALS
RESPIRATION RATE: 16 BRPM | WEIGHT: 178 LBS | TEMPERATURE: 98.3 F | SYSTOLIC BLOOD PRESSURE: 153 MMHG | HEART RATE: 75 BPM | BODY MASS INDEX: 27.06 KG/M2 | DIASTOLIC BLOOD PRESSURE: 74 MMHG

## 2017-03-24 DIAGNOSIS — N81.6 RECTOCELE: ICD-10-CM

## 2017-03-24 DIAGNOSIS — N81.6 PROCTOCELE: Primary | ICD-10-CM

## 2017-03-24 PROCEDURE — 99024 POSTOP FOLLOW-UP VISIT: CPT | Performed by: OBSTETRICS & GYNECOLOGY

## 2017-03-24 RX ORDER — ESTRADIOL 0.1 MG/G
CREAM VAGINAL
Refills: 0 | COMMUNITY
Start: 2017-03-10 | End: 2018-08-14 | Stop reason: SDUPTHER

## 2017-03-24 NOTE — PROGRESS NOTES
GYN ONCOLOGY FOLLOW-UP    Whit Davis  [id number]  1965    Chief Complaint: Post-op (spotting, tender and sore)    1.  Visit after rectocele repair  2.  Residual spotting and soreness.    History of present illness:  Whit Davis is a 52 y.o. year old female who is here today for a post-operative visit.  She is in general doing quite well.  That she has daily bowel function.  He has avoided constipation.  There is still some residual soreness and spotting.  She has used her available estrogen vaginal cream but does have a refill.    Oncology History:     No history exists.       Past Medical History:   Diagnosis Date   • Internal hemorrhoids    • Meningitis    • MRSA (methicillin resistant Staphylococcus aureus)     abdomen- 14 years ago- sen infectious disease   • Osteomyelitis     L5S1 following ASC   • RA (rheumatoid arthritis)    • Raynaud's disease    • Viral meningitis    • Wears glasses        Past Surgical History:   Procedure Laterality Date   • ABDOMINOPLASTY     • ACHILLES TENDON REPAIR Left    •  SECTION      x1   • CHOLECYSTECTOMY N/A 2016    Procedure: CHOLECYSTECTOMY LAPAROSCOPIC, WITH IOC;  Surgeon: Yaakov Resendez MD;  Location:  [facility];  Service:    • COLONOSCOPY         • HEMORRHOIDECTOMY     • HYSTERECTOMY      total abdominal   • OOPHORECTOMY     • OTHER SURGICAL HISTORY  2015    Bladder surgery with suspecison and repair   • POSTERIOR VAGINAL REPAIR N/A 3/9/2017    Procedure: POSTERIOR VAGINAL REPAIR;  Surgeon: Baron Salazar MD;  Location:  [facility];  Service:    • SACROCOLPOPEXY      abdominal approach   • TONSILLECTOMY     • TOTAL LAPAROSCOPIC HYSTERECTOMY WITH DAVINCI ROBOT      RTLH/BSO for benign uterine tumor       MEDICATIONS: The current medication list was reviewed and reconciled.     Allergies:  is allergic to bactrim [sulfamethoxazole-trimethoprim].    Family History   Problem Relation Age of Onset   • Heart disease Mother    • BRCA 1/2 Neg  Hx    • Breast cancer Neg Hx    • Ovarian cancer Neg Hx          .     Review of Systems   Review of systems reveals no localizing symptomatology or abnormality.  She is overall quite healthy.    Physical Exam  Vital Signs: /74  Pulse 75  Temp 98.3 °F (36.8 °C) (Temporal Artery )   Resp 16  Wt 178 lb (80.7 kg)  BMI 27.06 kg/m2   General Appearance:  alert, cooperative, no apparent distress, appears stated age and normal weight   Neurologic/Psychiatric: A&O x 3, gait steady, appropriate affect   HEENT:  Normocephalic, without obvious abnormality, mucous membranes moist   Neck: Supple, symmetrical, trachea midline, no adenopathy;  No thyromegaly, masses, or tenderness   Back:   Symmetric, no curvature, ROM normal, no CVA tenderness   Lungs:   Clear to auscultation bilaterally; respirations regular, even, and unlabored bilaterally   Heart:  Regular rate and rhythm, no murmurs appreciated   Breasts:  deferred   Abdomen:   Soft, non-tender, non-distended and no organomegaly   Lymph nodes: No cervical, supraclavicular, inguinal or axillary adenopathy noted   Extremities: Normal, atraumatic; no clubbing, cyanosis, or edema    Pelvic: External Genitalia  without lesions or skin changes  Vagina  is pink, moist, without lesions.  and Her suture line in the posterior vagina is healing nicely and is intact.       There is excellent postoperative support.  ECOG Performance Status: 0 - Asymptomatic          Assessment and Plan:    Whit was seen today for post-op.    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  Preoperatively she came and had a significant rectocele which was symptomatic in that she is a schoolteacher and was standing for extended period time.  He is now 2 weeks postoperative repair of her rectocele.  Other than some minimal  symptomatology she is healing nicely.        No Follow-up on file.   1.  Patient to refill the vaginal estrogen cream one more time and use it until empty  2.  Gradually resume activity although continue to avoid eating more than 7-10 pounds.  She has 2 more weeks off from school which should be adequate.  Continue some limitation activity up to 8 weeks postop  3.  Return in 3 months.      Baron Salazar MD      Note: Speech recognition transcription software was used to dictate portions of this document.  An attempt at proofreading has been made though minor errors in transcription may still be present.  Please do not hesitate to call our office with any questions.

## 2017-04-03 ENCOUNTER — TELEPHONE (OUTPATIENT)
Dept: GYNECOLOGIC ONCOLOGY | Facility: CLINIC | Age: 52
End: 2017-04-03

## 2017-04-03 NOTE — TELEPHONE ENCOUNTER
"Returned pt's call from voice message saying she noticed a white thread hanging from vagina, no bleeding, has some thin yellow-tinged drainage, no fever, bowel and bladder ok.  States this did not start until after her post-op exam with Dr. Salazar.  Told pt it could be a suture that has come loose.  Pt states it is \"Snug\" feeling. She does have some light yellow-tinged vaginal d/c, no foul odor, but she worries because she had a problem in the past after surgery where she had infection and they had to drain it.  Offered pt an appt.  Pt states will monitor and call if needed.    "

## 2017-06-22 RX ORDER — POLYETHYLENE GLYCOL 3350 17 G/17G
POWDER, FOR SOLUTION ORAL
Qty: 527 G | Refills: 0 | Status: SHIPPED | OUTPATIENT
Start: 2017-06-22 | End: 2017-07-17 | Stop reason: SDUPTHER

## 2017-06-22 RX ORDER — LINACLOTIDE 290 UG/1
CAPSULE, GELATIN COATED ORAL
Qty: 30 CAPSULE | Refills: 0 | Status: SHIPPED | OUTPATIENT
Start: 2017-06-22 | End: 2019-08-15 | Stop reason: SDUPTHER

## 2017-07-05 RX ORDER — POLYETHYLENE GLYCOL 3350 17 G/17G
POWDER, FOR SOLUTION ORAL
Qty: 527 G | Refills: 0 | OUTPATIENT
Start: 2017-07-05

## 2017-07-05 RX ORDER — LINACLOTIDE 290 UG/1
CAPSULE, GELATIN COATED ORAL
Qty: 30 CAPSULE | Refills: 0 | OUTPATIENT
Start: 2017-07-05

## 2017-07-17 ENCOUNTER — OFFICE VISIT (OUTPATIENT)
Dept: GYNECOLOGIC ONCOLOGY | Facility: CLINIC | Age: 52
End: 2017-07-17

## 2017-07-17 VITALS
OXYGEN SATURATION: 97 % | HEART RATE: 74 BPM | SYSTOLIC BLOOD PRESSURE: 110 MMHG | BODY MASS INDEX: 28.28 KG/M2 | RESPIRATION RATE: 16 BRPM | DIASTOLIC BLOOD PRESSURE: 72 MMHG | TEMPERATURE: 98.6 F | WEIGHT: 186 LBS

## 2017-07-17 DIAGNOSIS — K59.01 SLOW TRANSIT CONSTIPATION: ICD-10-CM

## 2017-07-17 DIAGNOSIS — N81.6 RECTOCELE: Primary | ICD-10-CM

## 2017-07-17 PROCEDURE — 99214 OFFICE O/P EST MOD 30 MIN: CPT | Performed by: NURSE PRACTITIONER

## 2017-07-17 RX ORDER — PSEUDOEPHEDRINE HCL 30 MG
200 TABLET ORAL 2 TIMES DAILY
Qty: 60 EACH | Refills: 5 | Status: SHIPPED | OUTPATIENT
Start: 2017-07-17 | End: 2018-02-06 | Stop reason: SDUPTHER

## 2017-07-17 RX ORDER — POLYETHYLENE GLYCOL 3350 17 G/17G
17 POWDER, FOR SOLUTION ORAL DAILY
Qty: 527 G | Refills: 0 | Status: SHIPPED | OUTPATIENT
Start: 2017-07-17 | End: 2019-08-15

## 2017-07-17 NOTE — PROGRESS NOTES
GYN ONCOLOGY FOLLOW-UP    Whit Davis  [id number]  1965    Chief Complaint: Follow-up (constipation)        History of present illness:  Whit Davis is a 52 y.o. year old female who is here today for follow-up for history of rectocele with repair.  The patient still complains of chronic constipation.  She was given a prescription for Linzess to use as needed, which she is taking 3-4 times per month when she is severely constipated.  She has also been using MiraLAX, but has not used it daily since her prescription ran out a month or more ago.  She requests a refill on this medication as well as a refill on her stool softeners. She also notes she hasn't been drinking as much water as she has been traveling recently and out of her normal routine.  She notes she has to strain to have bowel movements and is concerned as she will have a recurrent rectocele. She has had no vaginal bleeding or pelvic pain, except for some rectal pressure with bowel movements. She had a colonoscopy done about 5 years ago with Dr. Arana with findings consistent with internal hemorrhoids, for which she underwent surgery.     Oncology History:     No history exists.       Past Medical History:   Diagnosis Date   • Internal hemorrhoids    • Meningitis    • MRSA (methicillin resistant Staphylococcus aureus)     abdomen- 14 years ago- sen infectious disease   • Osteomyelitis     L5S1 following ASC   • RA (rheumatoid arthritis)    • Raynaud's disease    • Viral meningitis    • Wears glasses        Past Surgical History:   Procedure Laterality Date   • ABDOMINOPLASTY     • ACHILLES TENDON REPAIR Left    •  SECTION      x1   • CHOLECYSTECTOMY N/A 2016    Procedure: CHOLECYSTECTOMY LAPAROSCOPIC, WITH IOC;  Surgeon: Yaakov Resendez MD;  Location: [facility];  Service:    • COLONOSCOPY         • HEMORRHOIDECTOMY     • HYSTERECTOMY      total abdominal   • OOPHORECTOMY     • OTHER SURGICAL HISTORY  2015    Bladder  surgery with suspecison and repair   • POSTERIOR VAGINAL REPAIR N/A 3/9/2017    Procedure: POSTERIOR VAGINAL REPAIR;  Surgeon: Baron Salazar MD;  Location: [facility];  Service:    • SACROCOLPOPEXY      abdominal approach   • TONSILLECTOMY     • TOTAL LAPAROSCOPIC HYSTERECTOMY WITH DAVINCI ROBOT      RTLH/BSO for benign uterine tumor       MEDICATIONS: The current medication list was reviewed and reconciled.     Allergies:  is allergic to bactrim [sulfamethoxazole-trimethoprim].    Family History   Problem Relation Age of Onset   • Heart disease Mother    • BRCA 1/2 Neg Hx    • Breast cancer Neg Hx    • Ovarian cancer Neg Hx        Review of Systems   Constitutional: Negative for appetite change, chills, fatigue, fever and unexpected weight change.   Respiratory: Negative for cough, shortness of breath and wheezing.    Cardiovascular: Negative for chest pain, palpitations and leg swelling.   Gastrointestinal: Positive for constipation. Negative for abdominal distention, abdominal pain, blood in stool, diarrhea, nausea and vomiting.   Endocrine: Negative.    Genitourinary: Negative for dyspareunia, dysuria, frequency, genital sores, hematuria, pelvic pain, urgency, vaginal bleeding, vaginal discharge and vaginal pain.   Musculoskeletal: Negative for arthralgias, gait problem and joint swelling.   Neurological: Negative for dizziness, seizures, syncope, weakness, light-headedness, numbness and headaches.   Hematological: Negative for adenopathy.   Psychiatric/Behavioral: Negative.        Physical Exam  Vital Signs: /72  Pulse 74  Temp 98.6 °F (37 °C) (Temporal Artery )   Resp 16  Wt 186 lb (84.4 kg)  SpO2 97%  BMI 28.28 kg/m2   General Appearance:  alert, cooperative, no apparent distress and appears stated age   Neurologic/Psychiatric: A&O x 3, gait steady, appropriate affect   HEENT:  Normocephalic, without obvious abnormality, mucous membranes moist   Neck: Supple, symmetrical, trachea midline, no  adenopathy;  No thyromegaly, masses, or tenderness   Back:   Symmetric, no curvature, ROM normal, no CVA tenderness   Lungs:   Clear to auscultation bilaterally; respirations regular, even, and unlabored bilaterally   Heart:  Regular rate and rhythm, no murmurs appreciated   Breasts:  deferred   Abdomen:   Soft, non-tender, non-distended and no organomegaly   Lymph nodes: No cervical, supraclavicular, inguinal or axillary adenopathy noted   Extremities: Normal, atraumatic; no clubbing, cyanosis, or edema    Pelvic: External Genitalia  without lesions or skin changes  Vagina  is pink, moist, without lesions.   Vaginal Cuff  Female Vaginal Cuff: smooth, intact, without visible lesions and no residual recetocele or cystocele noted, good support, well healed  Uterus  surgically absent  Ovaries  surgically absent bilaterallly and without palpable masses or fullness  Parametria  smooth  Rectovaginal  Female rectovaginal: confirms no masses or bleeding and Hemoccult negative     ECOG Performance Status: 0 - Asymptomatic    Procedure Notes:  No notes on file    Assessment and Plan:    Whti was seen today for follow-up.    Diagnoses and all orders for this visit:    Rectocele    Slow transit constipation    Other orders  -     docusate sodium 100 MG capsule; Take 200 mg by mouth 2 (Two) Times a Day.  -     polyethylene glycol (MIRALAX) powder; Take 17 g by mouth Daily.        The patient was instructed to call with vaginal bleeding, discharge, pelvic pain, change in bowel or bladder function, or any new symptoms for evaluation of her complaints.     Refill given on Miralax and stool softeners. I reviewed with the patient the benefit of adherence to a good bowel regimen. I instructed her to take MiraLAX daily as well as stool softeners twice a day.  We also discussed the importance of high fiber intake as well as increased water in her diet.  The patient can also use Linzess more frequently for progressive constipation not  relieved by MiraLAX.  I discussed the patient is safe to use MiraLAX daily, she can titrate dose to twice a day if needed for worsening constipation.    She will call if she needs refills on Linzess.    Colonoscopy UTD  Mamm UTD- follow up due 9/2017  The patient will follow up with rheumatology regarding treatment of rheumatoid arthritis.   Return in about 1 year (around 7/17/2018) for Annual Exam.      Thelma Guzmán, APRN      Note: Speech recognition transcription software was used to dictate portions of this document.  An attempt at proofreading has been made though minor errors in transcription may still be present.  Please do not hesitate to call our office with any questions.

## 2018-02-06 RX ORDER — DOCUSATE SODIUM 100 MG/1
CAPSULE ORAL
Qty: 60 CAPSULE | Refills: 5 | OUTPATIENT
Start: 2018-02-06

## 2018-02-06 RX ORDER — PSEUDOEPHEDRINE HCL 30 MG
200 TABLET ORAL 2 TIMES DAILY
Qty: 60 EACH | Refills: 5 | Status: SHIPPED | OUTPATIENT
Start: 2018-02-06 | End: 2018-11-14 | Stop reason: SDUPTHER

## 2018-08-14 ENCOUNTER — OFFICE VISIT (OUTPATIENT)
Dept: GYNECOLOGIC ONCOLOGY | Facility: CLINIC | Age: 53
End: 2018-08-14

## 2018-08-14 DIAGNOSIS — Z01.419 WELL WOMAN EXAM WITH ROUTINE GYNECOLOGICAL EXAM: Primary | ICD-10-CM

## 2018-08-14 DIAGNOSIS — N39.3 STRESS INCONTINENCE OF URINE: ICD-10-CM

## 2018-08-14 PROCEDURE — 99396 PREV VISIT EST AGE 40-64: CPT | Performed by: NURSE PRACTITIONER

## 2018-08-14 RX ORDER — ESTRADIOL 0.1 MG/G
2 CREAM VAGINAL 3 TIMES WEEKLY
Qty: 42.5 G | Refills: 5 | Status: SHIPPED | OUTPATIENT
Start: 2018-08-15 | End: 2022-09-12 | Stop reason: SDUPTHER

## 2018-08-14 NOTE — PROGRESS NOTES
GYN ONCOLOGY ANNUAL WELL WOMAN VISIT      Whit Davis  [id number]  1965      Chief Complaint: Annual Exam (occ UI)        History of present illness:  Whit Davis is a 53 y.o. year old female who is here today for an annual exam. She has a personal history of rectocele with repair in , benign. She is also s/p hyst/BSO. She is feeling generally well today. She denies vaginal bleeding, pelvic pain, concerning lesions, breast concerns, and changes in bowel function. She still suffers from chronic constipation, currently managed with Linzess 290 mg daily and Miralax PRN. She c/o occasional leaking of urine that is slightly worse over the last year. She describes mostly small leaking when coughing, sneezing, laughing, or bearing down. She denies urinary urgency or dysuria. She denies full loss of bladder at any time. She is not currently interested in repeat surgery, but would like to discuss options if symptoms worsen. She is prescribed Estrace vaginal cream, but admits she has not used this regularly as prescribed.       Obstetric History:  OB History      Para Term  AB Living    2 2            SAB TAB Ectopic Molar Multiple Live Births                        Menstrual History:     No LMP recorded. Patient has had a hysterectomy.          Past Medical History:   Diagnosis Date   • Internal hemorrhoids    • Meningitis    • MRSA (methicillin resistant Staphylococcus aureus)     abdomen- 14 years ago- sen infectious disease   • Osteomyelitis (CMS/HCC)     L5S1 following ASC   • RA (rheumatoid arthritis) (CMS/HCC)    • Raynaud's disease    • Viral meningitis    • Wears glasses        Past Surgical History:   Procedure Laterality Date   • ABDOMINOPLASTY     • ACHILLES TENDON REPAIR Left    •  SECTION      x1   • CHOLECYSTECTOMY N/A 2016    Procedure: CHOLECYSTECTOMY LAPAROSCOPIC, WITH IOC;  Surgeon: Yaakov Resendez MD;  Location: [facility];  Service:    • COLONOSCOPY          • HEMORRHOIDECTOMY     • HYSTERECTOMY      total abdominal   • OOPHORECTOMY     • OTHER SURGICAL HISTORY  06/2015    Bladder surgery with suspecison and repair   • POSTERIOR VAGINAL REPAIR N/A 3/9/2017    Procedure: POSTERIOR VAGINAL REPAIR;  Surgeon: Baron Salazar MD;  Location: [facility];  Service:    • SACROCOLPOPEXY      abdominal approach   • TONSILLECTOMY     • TOTAL LAPAROSCOPIC HYSTERECTOMY WITH DAVINCI ROBOT      RTLH/BSO for benign uterine tumor       MEDICATIONS: The current medication list was reviewed and reconciled.     Allergies:  is allergic to bactrim [sulfamethoxazole-trimethoprim].    Family History   Problem Relation Age of Onset   • Heart disease Mother    • BRCA 1/2 Neg Hx    • Breast cancer Neg Hx    • Ovarian cancer Neg Hx        Health Maintenance:  Last mammogram was 2016. Last colonoscopy was < 10 years ago, with recommended follow-up in 10 year(s). Last pap smear was 7/2016, results were  normal PAP..    Review of Systems   Constitutional: Negative for fatigue, fever and unexpected weight change.   HENT: Negative for congestion, ear pain, hearing loss, sinus pressure and trouble swallowing.    Eyes: Negative for visual disturbance.   Respiratory: Negative for cough, chest tightness, shortness of breath and wheezing.    Cardiovascular: Negative for chest pain, palpitations and leg swelling.   Gastrointestinal: Negative for abdominal distention, abdominal pain, constipation, diarrhea, nausea and vomiting.   Endocrine: Negative for cold intolerance, heat intolerance, polydipsia, polyphagia and polyuria.   Genitourinary: Positive for enuresis (occ WANDER). Negative for difficulty urinating, dyspareunia, dysuria, frequency, hematuria, pelvic pain, urgency, vaginal bleeding, vaginal discharge and vaginal pain.   Musculoskeletal: Negative for arthralgias, gait problem, joint swelling and myalgias.   Skin: Negative for color change, pallor and rash.   Neurological: Negative for dizziness,  "seizures, syncope, weakness, light-headedness, numbness and headaches.   Hematological: Negative for adenopathy. Does not bruise/bleed easily.   Psychiatric/Behavioral: Negative for agitation, confusion, sleep disturbance and suicidal ideas. The patient is not nervous/anxious.        Physical Exam  Vital Signs: /83   Pulse 55   Temp 98.3 °F (36.8 °C) (Temporal Artery )   Resp 16   Ht 171.5 cm (67.5\")   Wt 88.5 kg (195 lb)   SpO2 95%   BMI 30.09 kg/m²    General Appearance:  alert, cooperative, no apparent distress and appears stated age   Neurologic/Psychiatric: A&O x 3, gait steady, appropriate affect   HEENT:  Normocephalic, without obvious abnormality, mucous membranes moist   Neck: Supple, symmetrical, trachea midline, no adenopathy;  No thyromegaly, masses, or tenderness   Back:   Symmetric, no curvature, ROM normal, no CVA tenderness   Lungs:   Clear to auscultation bilaterally; respirations regular, even, and unlabored bilaterally   Heart:  Regular rate and rhythm, no murmurs appreciated   Breasts:  Symmetrical, no masses, no lesions and no nipple discharge   Abdomen:   Soft, non-tender, non-distended and no organomegaly   Lymph nodes: No cervical, supraclavicular, inguinal or axillary adenopathy noted   Extremities: Normal, atraumatic; no clubbing, cyanosis, or edema    Skin: No rashes, ulcers, or suspicious lesions noted   Pelvic: External Genitalia  without lesions or skin changes  Vagina  is pink, moist, without lesions.  and no significant cystocele. Posterior vaginal wall changes consistent with previous rectocele repair  Vaginal Cuff  Female Vaginal Cuff: smooth, intact, without visible lesions  Uterus  surgically absent  Ovaries  surgically absent bilaterallly  Parametria  smooth  Rectovaginal  Female rectovaginal: confirms no masses or bleeding and Hemoccult negative       Procedure Note:  No notes on file    Assessment and Plan:    Whit was seen today for annual exam.    Diagnoses and " all orders for this visit:    Well woman exam with routine gynecological exam    Stress incontinence of urine  -     estradiol (ESTRACE VAGINAL) 0.1 MG/GM vaginal cream; Insert 2 g into the vagina 3 (Three) Times a Week. Before bed.      Pap was not done today.  I explained to Whit that the Pap smears are no longer recommended in patient's after hysterectomy.   I stressed to Whit that she still should be seen to be seen yearly for a full physical including breast and pelvic exam.    She was encouraged to get yearly mammograms.  She should report any palpable breast lump(s) or skin changes regardless of mammographic findings.  I explained to Whit that notification regarding her mammogram results will come from the center performing the study.  Our office will not be routinely calling with mammogram results.  It is her responsibility to make sure that the results from the mammogram are communicated to her by the breast center.  If she has any questions about the results, she is welcome to call our office anytime. The phone number to schedule a mammogram will be given to Whit today at the time of check out.  I explained that she should be able to call the center directly to schedule her screening mammogram without a physician's order.  So long as she gives them our name, a copy of the mammogram report should be sent to us for review.    Repeat colonoscopy at 10 year interval or sooner if new symptoms.     Repeat DEXA at age 59 unless new problems.     Patient and I discussed her occ WANDER symptoms. There is no significant cystocele noted on exam and she feels this is overall tolerable. We discussed benefits of local estrogen replacement and she agrees to be more compliant with medication. Encouraged Kegel exercises, continue bowel regimen to avoid constipation, and avoid heavy lifting. If symptoms worsen or persist, we may consider referral to urology or UroGynecology in the future. She v/u.         Return to  clinic in 1 year for Annual exam.      ELI Lugo      Note: Speech recognition transcription software was used to dictate portions of this document.  An attempt at proofreading has been made though minor errors in transcription may still be present.  Please do not hesitate to call our office with any questions.

## 2018-08-21 VITALS
RESPIRATION RATE: 16 BRPM | DIASTOLIC BLOOD PRESSURE: 83 MMHG | TEMPERATURE: 98.3 F | OXYGEN SATURATION: 85 % | HEIGHT: 68 IN | HEART RATE: 55 BPM | SYSTOLIC BLOOD PRESSURE: 125 MMHG | BODY MASS INDEX: 29.55 KG/M2 | WEIGHT: 195 LBS

## 2018-08-23 ENCOUNTER — TRANSCRIBE ORDERS (OUTPATIENT)
Dept: GYNECOLOGIC ONCOLOGY | Facility: CLINIC | Age: 53
End: 2018-08-23

## 2018-08-23 DIAGNOSIS — Z12.31 VISIT FOR SCREENING MAMMOGRAM: Primary | ICD-10-CM

## 2018-09-12 ENCOUNTER — APPOINTMENT (OUTPATIENT)
Dept: MAMMOGRAPHY | Facility: HOSPITAL | Age: 53
End: 2018-09-12

## 2018-10-01 ENCOUNTER — HOSPITAL ENCOUNTER (OUTPATIENT)
Dept: MAMMOGRAPHY | Facility: HOSPITAL | Age: 53
Discharge: HOME | End: 2018-10-01
Admitting: NURSE PRACTITIONER

## 2018-10-01 DIAGNOSIS — Z12.31 VISIT FOR SCREENING MAMMOGRAM: ICD-10-CM

## 2018-10-01 PROCEDURE — 77067 SCR MAMMO BI INCL CAD: CPT

## 2018-10-01 PROCEDURE — 77063 BREAST TOMOSYNTHESIS BI: CPT | Performed by: RADIOLOGY

## 2018-10-01 PROCEDURE — 77063 BREAST TOMOSYNTHESIS BI: CPT

## 2018-10-01 PROCEDURE — 77067 SCR MAMMO BI INCL CAD: CPT | Performed by: RADIOLOGY

## 2018-10-12 ENCOUNTER — HOSPITAL ENCOUNTER (OUTPATIENT)
Dept: MAMMOGRAPHY | Facility: HOSPITAL | Age: 53
Discharge: HOME | End: 2018-10-12
Admitting: RADIOLOGY

## 2018-10-12 ENCOUNTER — TRANSCRIBE ORDERS (OUTPATIENT)
Dept: MAMMOGRAPHY | Facility: HOSPITAL | Age: 53
End: 2018-10-12

## 2018-10-12 DIAGNOSIS — R92.8 ABNORMAL MAMMOGRAM: ICD-10-CM

## 2018-10-12 DIAGNOSIS — R92.8 ABNORMAL MAMMOGRAM: Primary | ICD-10-CM

## 2018-10-12 PROCEDURE — 77066 DX MAMMO INCL CAD BI: CPT | Performed by: RADIOLOGY

## 2018-10-12 PROCEDURE — 77066 DX MAMMO INCL CAD BI: CPT

## 2018-11-12 ENCOUNTER — HOSPITAL ENCOUNTER (OUTPATIENT)
Dept: MAMMOGRAPHY | Facility: HOSPITAL | Age: 53
Discharge: HOME | End: 2018-11-12

## 2018-11-12 ENCOUNTER — HOSPITAL ENCOUNTER (OUTPATIENT)
Dept: MAMMOGRAPHY | Facility: HOSPITAL | Age: 53
Discharge: HOME | End: 2018-11-12
Attending: RADIOLOGY | Admitting: RADIOLOGY

## 2018-11-12 ENCOUNTER — HOSPITAL ENCOUNTER (OUTPATIENT)
Dept: MAMMOGRAPHY | Facility: HOSPITAL | Age: 53
Discharge: HOME | End: 2018-11-12
Attending: RADIOLOGY

## 2018-11-12 DIAGNOSIS — R92.8 ABNORMAL MAMMOGRAM: ICD-10-CM

## 2018-11-12 PROCEDURE — 88305 TISSUE EXAM BY PATHOLOGIST: CPT | Performed by: NURSE PRACTITIONER

## 2018-11-12 PROCEDURE — 77065 DX MAMMO INCL CAD UNI: CPT | Performed by: RADIOLOGY

## 2018-11-12 PROCEDURE — 76098 X-RAY EXAM SURGICAL SPECIMEN: CPT

## 2018-11-12 PROCEDURE — 19081 BX BREAST 1ST LESION STRTCTC: CPT | Performed by: RADIOLOGY

## 2018-11-12 PROCEDURE — A4648 IMPLANTABLE TISSUE MARKER: HCPCS

## 2018-11-12 PROCEDURE — 19082 BX BREAST ADD LESION STRTCTC: CPT | Performed by: RADIOLOGY

## 2018-11-12 RX ORDER — LIDOCAINE HYDROCHLORIDE 10 MG/ML
5 INJECTION, SOLUTION INFILTRATION; PERINEURAL ONCE
Status: COMPLETED | OUTPATIENT
Start: 2018-11-12 | End: 2018-11-12

## 2018-11-12 RX ORDER — LIDOCAINE HYDROCHLORIDE AND EPINEPHRINE 10; 10 MG/ML; UG/ML
10 INJECTION, SOLUTION INFILTRATION; PERINEURAL ONCE
Status: COMPLETED | OUTPATIENT
Start: 2018-11-12 | End: 2018-11-12

## 2018-11-12 RX ORDER — LIDOCAINE HYDROCHLORIDE AND EPINEPHRINE 10; 10 MG/ML; UG/ML
20 INJECTION, SOLUTION INFILTRATION; PERINEURAL ONCE
Status: COMPLETED | OUTPATIENT
Start: 2018-11-12 | End: 2018-11-12

## 2018-11-12 RX ADMIN — LIDOCAINE HYDROCHLORIDE 5 ML: 10 INJECTION, SOLUTION INFILTRATION; PERINEURAL at 10:39

## 2018-11-12 RX ADMIN — LIDOCAINE HYDROCHLORIDE AND EPINEPHRINE 20 ML: 10; 10 INJECTION, SOLUTION INFILTRATION; PERINEURAL at 10:41

## 2018-11-12 RX ADMIN — LIDOCAINE HYDROCHLORIDE,EPINEPHRINE BITARTRATE 14 ML: 10; .01 INJECTION, SOLUTION INFILTRATION; PERINEURAL at 09:50

## 2018-11-12 RX ADMIN — LIDOCAINE HYDROCHLORIDE 5 ML: 10 INJECTION, SOLUTION INFILTRATION; PERINEURAL at 09:50

## 2018-11-12 NOTE — PROGRESS NOTES
Alert and orientated. Denies discomfort. No active bleeding. Steri-strips intact, gauze dressing applied x2. Ice packs given. Verbalizes and demonstrates understanding of post-care instructions and written copy given.

## 2018-11-12 NOTE — PROGRESS NOTES
Addendum:  Large, palpable hematoma noted R LIQ measures ~ 6 cm x 5cm; tender to touch, bruising noted. Emphasized diligence in monitoring for signs and symptoms of infection in right breast & possible extended length of time hematoma & bruising may persist. Ice packs given. Encouraged continued ice application intermittently x 72 hours then cautious heat prn. Verbalizes and demonstrates understanding of hematoma instructions. Encouraged to call back with questions or concerns. Patient verbalized understanding.

## 2018-11-13 ENCOUNTER — TELEPHONE (OUTPATIENT)
Dept: MAMMOGRAPHY | Facility: HOSPITAL | Age: 53
End: 2018-11-13

## 2018-11-13 LAB
CYTO UR: NORMAL
LAB AP CASE REPORT: NORMAL
LAB AP CLINICAL INFORMATION: NORMAL
LAB AP DIAGNOSIS COMMENT: NORMAL
PATH REPORT.FINAL DX SPEC: NORMAL
PATH REPORT.GROSS SPEC: NORMAL

## 2018-11-13 RX ORDER — DOCUSATE SODIUM 100 MG/1
CAPSULE, LIQUID FILLED ORAL
Qty: 60 CAPSULE | Refills: 5 | OUTPATIENT
Start: 2018-11-13

## 2018-11-13 NOTE — TELEPHONE ENCOUNTER
Pathology results and recommendation given. Verbalizes understanding. States hematoma has not changed size and  extensive bruising. States having some discomfort, using analgesics with relief. Denies signs and symptoms of infection. Emphasized continued diligence in monitoring for signs and symptoms of infection in right breast & possible extended length of time hematoma & bruising may persist. Encouraged NSAIDs, if allowed, per label directions. Encouraged to call back with questions or concerns. Patient verbalized understanding.

## 2018-11-14 RX ORDER — PSEUDOEPHEDRINE HCL 30 MG
200 TABLET ORAL 2 TIMES DAILY
Qty: 60 EACH | Refills: 5 | Status: SHIPPED | OUTPATIENT
Start: 2018-11-14 | End: 2021-08-30

## 2018-11-15 ENCOUNTER — TRANSCRIBE ORDERS (OUTPATIENT)
Dept: ADMINISTRATIVE | Facility: HOSPITAL | Age: 53
End: 2018-11-15

## 2018-11-15 DIAGNOSIS — R92.8 ABNORMAL MAMMOGRAM: Primary | ICD-10-CM

## 2019-05-17 ENCOUNTER — HOSPITAL ENCOUNTER (OUTPATIENT)
Dept: MAMMOGRAPHY | Facility: HOSPITAL | Age: 54
Discharge: HOME | End: 2019-05-17
Admitting: RADIOLOGY

## 2019-05-17 ENCOUNTER — TRANSCRIBE ORDERS (OUTPATIENT)
Dept: MAMMOGRAPHY | Facility: HOSPITAL | Age: 54
End: 2019-05-17

## 2019-05-17 DIAGNOSIS — R92.8 ABNORMAL MAMMOGRAM: ICD-10-CM

## 2019-05-17 DIAGNOSIS — F78.A9: Primary | ICD-10-CM

## 2019-05-17 PROCEDURE — 77066 DX MAMMO INCL CAD BI: CPT | Performed by: RADIOLOGY

## 2019-05-17 PROCEDURE — 77066 DX MAMMO INCL CAD BI: CPT

## 2019-05-17 PROCEDURE — G0279 TOMOSYNTHESIS, MAMMO: HCPCS

## 2019-05-17 PROCEDURE — 77062 BREAST TOMOSYNTHESIS BI: CPT | Performed by: RADIOLOGY

## 2019-08-15 ENCOUNTER — OFFICE VISIT (OUTPATIENT)
Dept: GYNECOLOGIC ONCOLOGY | Facility: CLINIC | Age: 54
End: 2019-08-15

## 2019-08-15 VITALS
OXYGEN SATURATION: 99 % | BODY MASS INDEX: 32.87 KG/M2 | DIASTOLIC BLOOD PRESSURE: 74 MMHG | RESPIRATION RATE: 16 BRPM | WEIGHT: 213 LBS | SYSTOLIC BLOOD PRESSURE: 126 MMHG | HEART RATE: 88 BPM

## 2019-08-15 DIAGNOSIS — Z01.419 WELL WOMAN EXAM WITH ROUTINE GYNECOLOGICAL EXAM: Primary | ICD-10-CM

## 2019-08-15 DIAGNOSIS — Z12.11 SCREENING FOR COLON CANCER: ICD-10-CM

## 2019-08-15 PROCEDURE — 99396 PREV VISIT EST AGE 40-64: CPT | Performed by: NURSE PRACTITIONER

## 2019-08-15 NOTE — PROGRESS NOTES
GYN ONCOLOGY ANNUAL WELL WOMAN VISIT      Whit Davis  [id number]  1965      Chief Complaint: Annual Exam        History of present illness:  Whit Davis is a 54 y.o. year old female who is here today for an annual exam. She has a personal history of rectocele with repair in , benign. She is also s/p hyst/BSO. History significant for L5S1 osteomyelitis following initial ASC.    She denies vaginal bleeding, pelvic pain, concerning lesions, breast concerns, and changes in bowel function. She still suffers from chronic constipation, currently managed with Linzess 290 PRN, requests refills. She notes both of her parents have had multiple polyps and she is desiring of referral for colonoscopy, prefers Dr. Perea at [facility] where the rest of her family goes. Mammogram is currently UTD, repeat bilateral diagnostic mammogram scheduled in 2019.  Patient notes weight gain over the last year, working with her rheumatologist on RA control and finding the right medications. She is scheduled to retire in 6 weeks and looking forward to having more time for self care.       Obstetric History:  OB History      Para Term  AB Living    2 2 2          SAB TAB Ectopic Molar Multiple Live Births                        Menstrual History:     No LMP recorded. Patient has had a hysterectomy.          Past Medical History:   Diagnosis Date   • Internal hemorrhoids    • Meningitis    • MRSA (methicillin resistant Staphylococcus aureus)     abdomen- 14 years ago- sen infectious disease   • Osteomyelitis (CMS/HCC)     L5S1 following ASC   • RA (rheumatoid arthritis) (CMS/HCC)    • Raynaud's disease    • Viral meningitis    • Wears glasses        Past Surgical History:   Procedure Laterality Date   • ABDOMINOPLASTY     • ACHILLES TENDON REPAIR Left    • BREAST BIOPSY Bilateral 2018    Bilateral benign stereo's    •  SECTION      x1   • CHOLECYSTECTOMY N/A 2016    Procedure: CHOLECYSTECTOMY  LAPAROSCOPIC, WITH IOC;  Surgeon: Yaakov Resendez MD;  Location:  [facility];  Service:    • COLONOSCOPY      2010   • HEMORRHOIDECTOMY     • HYSTERECTOMY      AGE 48   • OTHER SURGICAL HISTORY  06/2015    Bladder surgery with suspecison and repair   • POSTERIOR VAGINAL REPAIR N/A 3/9/2017    Procedure: POSTERIOR VAGINAL REPAIR;  Surgeon: Baron Salazar MD;  Location:  [facility];  Service:    • SACROCOLPOPEXY      abdominal approach   • TONSILLECTOMY     • TOTAL LAPAROSCOPIC HYSTERECTOMY WITH DAVINCI ROBOT      RTLH/BSO for benign uterine tumor       MEDICATIONS: The current medication list was reviewed and reconciled.     Allergies:  is allergic to bactrim [sulfamethoxazole-trimethoprim].    Family History   Problem Relation Age of Onset   • Heart disease Mother    • BRCA 1/2 Neg Hx    • Breast cancer Neg Hx    • Ovarian cancer Neg Hx    • Endometrial cancer Neg Hx        Health Maintenance:  Last mammogram was 2016. Last colonoscopy was < 10 years ago, with recommended follow-up in 10 year(s). Last pap smear was 7/2016, results were  normal PAP..    Review of Systems   Constitutional: Negative for fatigue, fever and unexpected weight change.   HENT: Negative for congestion, ear pain, hearing loss, sinus pressure and trouble swallowing.    Eyes: Negative for visual disturbance.   Respiratory: Negative for cough, chest tightness, shortness of breath and wheezing.    Cardiovascular: Negative for chest pain, palpitations and leg swelling.   Gastrointestinal: Positive for constipation. Negative for abdominal distention, abdominal pain, diarrhea, nausea and vomiting.   Endocrine: Negative for cold intolerance, heat intolerance, polydipsia, polyphagia and polyuria.   Genitourinary: Negative for difficulty urinating, dyspareunia, dysuria, enuresis, frequency, hematuria, pelvic pain, urgency, vaginal bleeding, vaginal discharge and vaginal pain.   Musculoskeletal: Negative for arthralgias, gait problem, joint swelling  and myalgias.   Skin: Negative for color change, pallor and rash.   Neurological: Negative for dizziness, seizures, syncope, weakness, light-headedness, numbness and headaches.   Hematological: Negative for adenopathy. Does not bruise/bleed easily.   Psychiatric/Behavioral: Negative for agitation, confusion, sleep disturbance and suicidal ideas. The patient is not nervous/anxious.        Physical Exam  Vital Signs: /74   Pulse 88   Resp 16   Wt 96.6 kg (213 lb)   SpO2 99%   BMI 32.87 kg/m²     Wt Readings from Last 10 Encounters:   08/15/19 96.6 kg (213 lb)   08/14/18 88.5 kg (195 lb)   07/17/17 84.4 kg (186 lb)   03/24/17 80.7 kg (178 lb)   03/09/17 82.6 kg (182 lb)   03/08/17 82.9 kg (182 lb 12.2 oz)   02/27/17 82.6 kg (182 lb)   02/24/17 82.1 kg (181 lb)   09/22/16 81.8 kg (180 lb 6.4 oz)   09/22/16 79.4 kg (175 lb)        General Appearance:  alert, cooperative, no apparent distress, appears stated age and obese   Neurologic/Psychiatric: A&O x 3, gait steady, appropriate affect   HEENT:  Normocephalic, without obvious abnormality, mucous membranes moist   Neck: Supple, symmetrical, trachea midline, no adenopathy;  No thyromegaly, masses, or tenderness   Back:   Symmetric, no curvature, ROM normal, no CVA tenderness   Lungs:   Clear to auscultation bilaterally; respirations regular, even, and unlabored bilaterally   Heart:  Regular rate and rhythm, no murmurs appreciated   Breasts:  Symmetrical, no masses, no lesions and no nipple discharge   Abdomen:   Soft, non-tender, non-distended and no organomegaly   Lymph nodes: No cervical, supraclavicular, inguinal or axillary adenopathy noted   Extremities: Normal, atraumatic; no clubbing, cyanosis, or edema    Skin: No rashes, ulcers, or suspicious lesions noted   Pelvic: External Genitalia  without lesions or skin changes  Vagina  is pink, moist, without lesions.  and grade 2 cystocele. Posterior vaginal wall changes consistent with previous rectocele  repair  Vaginal Cuff  Female Vaginal Cuff: smooth, intact, without visible lesions  Uterus  surgically absent  Ovaries  surgically absent bilaterallly  Parametria  smooth  Rectovaginal  deferred       Procedure Note:  No notes on file    Assessment and Plan:    Diagnoses and all orders for this visit:    Well woman exam with routine gynecological exam    Screening for colon cancer  -     Ambulatory Referral For Screening Colonoscopy    Other orders  -     linaclotide (LINZESS) 290 MCG capsule capsule; Take 1 capsule by mouth Daily.        Pap was not done today.  I explained to Whit that the Pap smears are no longer recommended in low risk patients after hysterectomy.   I stressed to Whit that she still should be seen to be seen yearly for a full physical including breast and pelvic exam.    She was encouraged to get yearly mammograms.  Repeat bilateral diagnostic mammogram scheduled in 11/2019. She should report any palpable breast lump(s) or skin changes regardless of mammographic findings.  I explained to Whit that notification regarding her mammogram results will come from the center performing the study.  Our office will not be routinely calling with mammogram results.  It is her responsibility to make sure that the results from the mammogram are communicated to her by the breast center.  If she has any questions about the results, she is welcome to call our office anytime.     Referral for screening colonoscopy to Dr. Perea ordered per patient request.     Repeat DEXA at age 59 unless new problems.     Linzess refills given.     Keep follow-ups with rheumatologist.     Patient and I discussed her ongoing occ WANDER symptoms. Grade 2 cystocele noted on exam and she feels this is overall tolerable.  Encouraged Kegel exercises, continue bowel regimen to avoid constipation, use of vaginal estrogen, and avoid heavy lifting. If symptoms worsen or persist, we may consider referral to UroGynecology in the future.  She v/u.       Return to clinic in 1 year for Annual exam.      ELI Lugo      Note: Speech recognition transcription software was used to dictate portions of this document.  An attempt at proofreading has been made though minor errors in transcription may still be present.  Please do not hesitate to call our office with any questions.

## 2019-11-19 ENCOUNTER — HOSPITAL ENCOUNTER (OUTPATIENT)
Dept: MAMMOGRAPHY | Facility: HOSPITAL | Age: 54
Discharge: HOME | End: 2019-11-19
Admitting: NURSE PRACTITIONER

## 2019-11-19 DIAGNOSIS — R92.8 ABNORMAL MAMMOGRAM: ICD-10-CM

## 2019-11-19 PROCEDURE — G0279 TOMOSYNTHESIS, MAMMO: HCPCS

## 2019-11-19 PROCEDURE — 77066 DX MAMMO INCL CAD BI: CPT

## 2019-11-19 PROCEDURE — 77066 DX MAMMO INCL CAD BI: CPT | Performed by: RADIOLOGY

## 2019-11-19 PROCEDURE — 77062 BREAST TOMOSYNTHESIS BI: CPT | Performed by: RADIOLOGY

## 2019-11-25 ENCOUNTER — TRANSCRIBE ORDERS (OUTPATIENT)
Dept: ADMINISTRATIVE | Facility: HOSPITAL | Age: 54
End: 2019-11-25

## 2019-11-25 DIAGNOSIS — R92.8 ABNORMAL MAMMOGRAM: Primary | ICD-10-CM

## 2020-08-19 ENCOUNTER — OFFICE VISIT (OUTPATIENT)
Dept: GYNECOLOGIC ONCOLOGY | Facility: CLINIC | Age: 55
End: 2020-08-19

## 2020-08-19 VITALS
HEIGHT: 68 IN | RESPIRATION RATE: 18 BRPM | OXYGEN SATURATION: 97 % | BODY MASS INDEX: 31.52 KG/M2 | WEIGHT: 208 LBS | HEART RATE: 81 BPM | TEMPERATURE: 97.8 F | SYSTOLIC BLOOD PRESSURE: 114 MMHG | DIASTOLIC BLOOD PRESSURE: 72 MMHG

## 2020-08-19 DIAGNOSIS — N81.4 CYSTOCELE WITH PROLAPSE: ICD-10-CM

## 2020-08-19 DIAGNOSIS — Z87.39 HISTORY OF OSTEOMYELITIS: ICD-10-CM

## 2020-08-19 DIAGNOSIS — Z01.419 WELL WOMAN EXAM WITH ROUTINE GYNECOLOGICAL EXAM: Primary | ICD-10-CM

## 2020-08-19 PROCEDURE — 99396 PREV VISIT EST AGE 40-64: CPT | Performed by: NURSE PRACTITIONER

## 2020-08-19 RX ORDER — CHLORTHALIDONE 25 MG/1
25 TABLET ORAL DAILY
COMMUNITY

## 2020-08-19 NOTE — PROGRESS NOTES
GYN ONCOLOGY ANNUAL WELL WOMAN VISIT      Whit Davis  [id number]  1965      Chief Complaint: Annual Exam (c/o hot flashes/night sweats and worsening prolapse)        History of present illness:  Whit Davis is a 55 y.o. year old female who is here today for an annual exam. History significant for L5S1 osteomyelitis following hysterectomy/ASC by Dr. Rosas in . Following her infection, she began management with Dr. [facility]azar [facility]. She subsequently underwent uncomplicated rectocele repair by him in 2017. She has continued routine gynecologic care with our office since that time. Since her visit last year patient has undergone medication changes for her RA under the direction of her rheumatologist. She c/o worsening hot flashes and night sweats waking her up 5-6 times per night. She does not desire hormonal management if she can avoid it, but wants recommendations on OTC products that may help.    Upon arrival today patient also c/o worsening prolapse and bladder trouble. Last year she was found to have a recurrent grade 2 cystocele, chose to manage conservatively at that time. Now patient reports worsening weakness/leaking x 6 months and new tissue protruding from the vagina x 2 months. Symptoms are worse in the evenings or when she is on her feet for extended periods of time. Kegels have not been helping. She feels she may need repeat surgical intervention but is very anxious about this due to her history of complications from her original surgery.         Obstetric History:  OB History        2    Para   2    Term   2            AB        Living           SAB        TAB        Ectopic        Molar        Multiple        Live Births                   Menstrual History:     No LMP recorded. Patient has had a hysterectomy.          Past Medical History:   Diagnosis Date   • Internal hemorrhoids    • Meningitis    • MRSA (methicillin resistant Staphylococcus aureus)      abdomen- 14 years ago- sen infectious disease   • Osteomyelitis (CMS/HCC)     L5S1 following ASC   • RA (rheumatoid arthritis) (CMS/HCC)    • Raynaud's disease    • Viral meningitis    • Wears glasses        Past Surgical History:   Procedure Laterality Date   • ABDOMINOPLASTY     • ACHILLES TENDON REPAIR Left    • BREAST BIOPSY Bilateral 2018    Bilateral benign stereo's    •  SECTION      x1   • CHOLECYSTECTOMY N/A 2016    Procedure: CHOLECYSTECTOMY LAPAROSCOPIC, WITH IOC;  Surgeon: Yaakov Resendez MD;  Location:  [facility];  Service:    • COLONOSCOPY         • HEMORRHOIDECTOMY     • HYSTERECTOMY      AGE 48   • OTHER SURGICAL HISTORY  2015    Bladder surgery with suspecison and repair   • POSTERIOR VAGINAL REPAIR N/A 3/9/2017    Procedure: POSTERIOR VAGINAL REPAIR;  Surgeon: Baron Salazar MD;  Location:  [facility];  Service:    • SACROCOLPOPEXY      abdominal approach   • TONSILLECTOMY     • TOTAL LAPAROSCOPIC HYSTERECTOMY WITH DAVINCI ROBOT      RTLH/BSO for benign uterine tumor       MEDICATIONS: The current medication list was reviewed and reconciled.     Allergies:  is allergic to vancomycin and bactrim [sulfamethoxazole-trimethoprim].    Family History   Problem Relation Age of Onset   • Heart disease Mother    • BRCA 1/2 Neg Hx    • Breast cancer Neg Hx    • Ovarian cancer Neg Hx    • Endometrial cancer Neg Hx        Health Maintenance:  Last mammogram was 2019. Last colonoscopy was 2019, with recommended follow-up in 10 year(s). Last pap smear was 2016, results were  normal PAP..    Review of Systems   Constitutional: Negative for fatigue, fever and unexpected weight change.   HENT: Negative for congestion, ear pain, hearing loss, sinus pressure and trouble swallowing.    Eyes: Negative for visual disturbance.   Respiratory: Negative for cough, chest tightness, shortness of breath and wheezing.    Cardiovascular: Negative for chest pain, palpitations and leg swelling.  "  Gastrointestinal: Negative for abdominal distention, abdominal pain, constipation, diarrhea, nausea and vomiting.   Endocrine: Negative for cold intolerance, heat intolerance, polydipsia, polyphagia and polyuria.   Genitourinary: Negative for difficulty urinating, dyspareunia, dysuria, enuresis, frequency, hematuria, pelvic pain, urgency, vaginal bleeding, vaginal discharge and vaginal pain.   Musculoskeletal: Negative for arthralgias, gait problem, joint swelling and myalgias.   Skin: Negative for color change, pallor and rash.   Neurological: Negative for dizziness, seizures, syncope, weakness, light-headedness, numbness and headaches.   Hematological: Negative for adenopathy. Does not bruise/bleed easily.   Psychiatric/Behavioral: Negative for agitation, confusion, sleep disturbance and suicidal ideas. The patient is not nervous/anxious.        Physical Exam  Vital Signs: /72   Pulse 81   Temp 97.8 °F (36.6 °C) (Temporal)   Resp 18   Ht 171.5 cm (67.52\")   Wt 94.3 kg (208 lb)   SpO2 97%   BMI 32.08 kg/m²     Wt Readings from Last 10 Encounters:   08/19/20 94.3 kg (208 lb)   08/15/19 96.6 kg (213 lb)   08/14/18 88.5 kg (195 lb)   07/17/17 84.4 kg (186 lb)   03/24/17 80.7 kg (178 lb)   03/09/17 82.6 kg (182 lb)   03/08/17 82.9 kg (182 lb 12.2 oz)   02/27/17 82.6 kg (182 lb)   02/24/17 82.1 kg (181 lb)   09/22/16 81.8 kg (180 lb 6.4 oz)        General Appearance:  alert, cooperative, no apparent distress, appears stated age   Neurologic/Psychiatric: A&O x 3, gait steady, appropriate affect   HEENT:  Normocephalic, without obvious abnormality, mucous membranes moist   Neck: Supple, symmetrical, trachea midline, no adenopathy;  No thyromegaly, masses, or tenderness   Back:   Symmetric, no curvature, ROM normal, no CVA tenderness   Lungs:   Clear to auscultation bilaterally; respirations regular, even, and unlabored bilaterally   Heart:  Regular rate and rhythm, no murmurs appreciated   Breasts:  " Symmetrical, no masses, no lesions and no nipple discharge   Abdomen:   Soft, non-tender, non-distended and no organomegaly   Lymph nodes: No cervical, supraclavicular, inguinal or axillary adenopathy noted   Extremities: Normal, atraumatic; no clubbing, cyanosis, or edema    Skin: No rashes, ulcers, or suspicious lesions noted   Pelvic: External Genitalia  without lesions or skin changes. Visible bulge at introitus.  Vagina  is pink, moist, without lesions. Posterior vaginal wall changes consistent with previous rectocele repair, well supported. Grade 3 cystocele, worsens with valsalva.   Vaginal Cuff  Female Vaginal Cuff: smooth, intact, without visible lesions  Uterus  surgically absent  Ovaries  surgically absent bilaterallly  Parametria  smooth  Rectovaginal  deferred       Procedure Note:  No notes on file    Assessment and Plan:    Diagnoses and all orders for this visit:    Well woman exam with routine gynecological exam    Cystocele with prolapse  -     Ambulatory Referral to Gynecologic Urology    History of osteomyelitis  -     Ambulatory Referral to Gynecologic Urology        Pap was not done today.  I explained to Whit that the Pap smears are no longer recommended in low risk patients after hysterectomy.   I stressed to Whit that she still should be seen to be seen yearly for a full physical including breast and pelvic exam.    She was encouraged to get yearly mammograms.  She should report any palpable breast lump(s) or skin changes regardless of mammographic findings.  I explained to Whit that notification regarding her mammogram results will come from the center performing the study.  Our office will not be routinely calling with mammogram results.  It is her responsibility to make sure that the results from the mammogram are communicated to her by the breast center.  If she has any questions about the results, she is welcome to call our office anytime.     Repeat colonoscopy per GI  schedule.    Repeat DEXA at age 59 unless new problems.     Keep follow-ups with rheumatologist.     Patient and I discussed her hot flashes/night sweats. She is interested in trying OTC options. I recommended black cohash or Estroven. Written information given. If these supplements are ineffective, she may call office for other prescription options.     Patient and I discussed her worsening WANDER symptoms and recurrent cystocele. She is understandably anxious about considering repeat surgical repair considering her osteomyelitis following her initial ASC and subsequent failure of the repair. Patient's symptoms and prolapse have obviously progressed over the last year and conservative measures/exercises are not helping. I feel she needs to consider pessary or surgical intervention. Patient is interested in further surgical consultation. I recommended referral to Dr. Payan at  [facility]. She is accepting, referral ordered.     Modifier 25 applies to today's visit due to significant additional time spent in counseling and coordination of care for recurrent cystocele. This was in addition to complete well woman examination and evaluation of postmenopausal vasomotor symptoms.        Return to clinic in 1 year for Annual exam.      Electronically signed by ELI Lugo on 08/26/20 at 09:02    Note: Speech recognition transcription software was used to dictate portions of this document.  An attempt at proofreading has been made though minor errors in transcription may still be present.  Please do not hesitate to call our office with any questions.

## 2020-08-25 ENCOUNTER — TELEPHONE (OUTPATIENT)
Dept: ONCOLOGY | Facility: CLINIC | Age: 55
End: 2020-08-25

## 2020-11-20 ENCOUNTER — HOSPITAL ENCOUNTER (OUTPATIENT)
Dept: MAMMOGRAPHY | Facility: HOSPITAL | Age: 55
Discharge: HOME | End: 2020-11-20
Admitting: NURSE PRACTITIONER

## 2020-11-20 DIAGNOSIS — R92.8 ABNORMAL MAMMOGRAM: ICD-10-CM

## 2020-11-20 PROCEDURE — G0279 TOMOSYNTHESIS, MAMMO: HCPCS

## 2020-11-20 PROCEDURE — 77062 BREAST TOMOSYNTHESIS BI: CPT | Performed by: RADIOLOGY

## 2020-11-20 PROCEDURE — 77066 DX MAMMO INCL CAD BI: CPT

## 2020-11-20 PROCEDURE — 77066 DX MAMMO INCL CAD BI: CPT | Performed by: RADIOLOGY

## 2021-08-10 ENCOUNTER — TELEPHONE (OUTPATIENT)
Dept: GYNECOLOGIC ONCOLOGY | Facility: CLINIC | Age: 56
End: 2021-08-10

## 2021-08-10 NOTE — TELEPHONE ENCOUNTER
Caller: SEDRICK    Relationship to patient: SELF    Best call back number: [phone]    Patient is needing: TO R/S 8- PAP SMEAR/ PELVICE EXAM APPT.

## 2021-08-30 ENCOUNTER — OFFICE VISIT (OUTPATIENT)
Dept: GYNECOLOGIC ONCOLOGY | Facility: CLINIC | Age: 56
End: 2021-08-30

## 2021-08-30 VITALS
BODY MASS INDEX: 31.08 KG/M2 | WEIGHT: 198 LBS | TEMPERATURE: 96.4 F | OXYGEN SATURATION: 98 % | SYSTOLIC BLOOD PRESSURE: 113 MMHG | RESPIRATION RATE: 16 BRPM | HEART RATE: 72 BPM | HEIGHT: 67 IN | DIASTOLIC BLOOD PRESSURE: 77 MMHG

## 2021-08-30 DIAGNOSIS — Z01.419 WELL WOMAN EXAM WITH ROUTINE GYNECOLOGICAL EXAM: Primary | ICD-10-CM

## 2021-08-30 PROCEDURE — 99396 PREV VISIT EST AGE 40-64: CPT | Performed by: NURSE PRACTITIONER

## 2021-08-30 RX ORDER — METHOTREXATE 2.5 MG/1
TABLET ORAL
COMMUNITY
Start: 2021-08-02

## 2021-10-07 ENCOUNTER — TRANSCRIBE ORDERS (OUTPATIENT)
Dept: ADMINISTRATIVE | Facility: HOSPITAL | Age: 56
End: 2021-10-07

## 2021-10-07 DIAGNOSIS — Z12.31 VISIT FOR SCREENING MAMMOGRAM: Primary | ICD-10-CM

## 2021-11-22 ENCOUNTER — HOSPITAL ENCOUNTER (OUTPATIENT)
Dept: MAMMOGRAPHY | Facility: HOSPITAL | Age: 56
Discharge: HOME | End: 2021-11-22
Admitting: NURSE PRACTITIONER

## 2021-11-22 ENCOUNTER — APPOINTMENT (OUTPATIENT)
Dept: MAMMOGRAPHY | Facility: HOSPITAL | Age: 56
End: 2021-11-22

## 2021-11-22 DIAGNOSIS — Z12.31 VISIT FOR SCREENING MAMMOGRAM: ICD-10-CM

## 2021-11-22 PROCEDURE — 77063 BREAST TOMOSYNTHESIS BI: CPT

## 2021-11-22 PROCEDURE — 77063 BREAST TOMOSYNTHESIS BI: CPT | Performed by: RADIOLOGY

## 2021-11-22 PROCEDURE — 77067 SCR MAMMO BI INCL CAD: CPT

## 2021-11-22 PROCEDURE — 77067 SCR MAMMO BI INCL CAD: CPT | Performed by: RADIOLOGY

## 2022-01-05 ENCOUNTER — HOSPITAL ENCOUNTER (OUTPATIENT)
Dept: ULTRASOUND IMAGING | Facility: HOSPITAL | Age: 57
Discharge: HOME | End: 2022-01-05

## 2022-01-05 ENCOUNTER — HOSPITAL ENCOUNTER (OUTPATIENT)
Dept: MAMMOGRAPHY | Facility: HOSPITAL | Age: 57
Discharge: HOME | End: 2022-01-05

## 2022-01-05 DIAGNOSIS — R92.8 ABNORMAL MAMMOGRAM: ICD-10-CM

## 2022-01-05 PROCEDURE — 76642 ULTRASOUND BREAST LIMITED: CPT

## 2022-01-05 PROCEDURE — 77061 BREAST TOMOSYNTHESIS UNI: CPT | Performed by: RADIOLOGY

## 2022-01-05 PROCEDURE — 76642 ULTRASOUND BREAST LIMITED: CPT | Performed by: RADIOLOGY

## 2022-01-05 PROCEDURE — 77065 DX MAMMO INCL CAD UNI: CPT

## 2022-01-05 PROCEDURE — 77065 DX MAMMO INCL CAD UNI: CPT | Performed by: RADIOLOGY

## 2022-01-05 PROCEDURE — G0279 TOMOSYNTHESIS, MAMMO: HCPCS

## 2022-09-12 ENCOUNTER — OFFICE VISIT (OUTPATIENT)
Dept: GYNECOLOGIC ONCOLOGY | Facility: CLINIC | Age: 57
End: 2022-09-12

## 2022-09-12 VITALS
WEIGHT: 209 LBS | OXYGEN SATURATION: 100 % | DIASTOLIC BLOOD PRESSURE: 78 MMHG | RESPIRATION RATE: 15 BRPM | TEMPERATURE: 97.7 F | SYSTOLIC BLOOD PRESSURE: 128 MMHG | BODY MASS INDEX: 33.23 KG/M2 | HEART RATE: 75 BPM

## 2022-09-12 DIAGNOSIS — Z87.39 HISTORY OF OSTEOMYELITIS: ICD-10-CM

## 2022-09-12 DIAGNOSIS — Z01.419 WELL WOMAN EXAM WITH ROUTINE GYNECOLOGICAL EXAM: Primary | ICD-10-CM

## 2022-09-12 PROCEDURE — 99396 PREV VISIT EST AGE 40-64: CPT | Performed by: NURSE PRACTITIONER

## 2022-09-12 RX ORDER — ESTRADIOL 0.1 MG/G
1 CREAM VAGINAL
COMMUNITY
Start: 2022-06-23 | End: 2022-09-12 | Stop reason: SDUPTHER

## 2022-09-12 RX ORDER — ESTRADIOL 0.1 MG/G
2 CREAM VAGINAL 3 TIMES WEEKLY
Qty: 42.5 G | Refills: 5 | Status: SHIPPED | OUTPATIENT
Start: 2022-09-12

## 2022-09-12 NOTE — PROGRESS NOTES
GYN ONCOLOGY ANNUAL WELL WOMAN VISIT      Whit Davis  [id number]  1965      Subjective   Chief Complaint: Annual Exam        History of present illness:      Whit Davis is a 57 y.o. year old female who is here today for an annual exam. History significant for L5S1 osteomyelitis following hysterectomy/ASC by Dr. Rosas in . Following her infection, she began management with Dr. Salazar [facility]. She subsequently underwent uncomplicated rectocele repair by him in . She has continued routine gynecologic care with our office since that time.  She underwent urogynecologic repair with Dr. Payan at  in 2021. Patient is very pleased with her outcomes and care. She expresses thankfulness for our referral and says her symptom relief has been remarkable.     Whit reports she is feeling very well today and has no complaints. She denies vaginal bleeding, pelvic pain, changes in bowel or bladder function, new or concerning lesions, and breast problems. She is happy with her vaginal Estrace cream, requests refills.         Obstetric History:  OB History        2    Para   2    Term   2            AB        Living           SAB        IAB        Ectopic        Molar        Multiple        Live Births                   Menstrual History:     No LMP recorded. Patient has had a hysterectomy.          The current medication list and allergy list were reviewed and reconciled.     Past Medical History, Past Surgical History, Social History, Family History have been reviewed and are without significant changes except as mentioned.    Health Maintenance:  Last mammogram was 2021. Last colonoscopy was 2019, with recommended follow-up in 5 year(s).  Last pap smear was , results were  normal PAP. She  does not have a history of abnormal pap smears.        Review of Systems   Constitutional: Negative.    Gastrointestinal: Negative.    Genitourinary: Negative.         Objective   Physical Exam  Vital Signs: /78   Pulse 75   Temp 97.7 °F (36.5 °C)   Resp 15   Wt 94.8 kg (209 lb)   SpO2 100%   BMI 33.23 kg/m²   Vitals:    09/12/22 0942   PainSc: 0-No pain           General Appearance:  alert, cooperative, no apparent distress and appears stated age   Neurologic/Psychiatric: A&O x 3, gait steady, appropriate affect   Lungs:   Clear to auscultation bilaterally; respirations regular, even, and unlabored bilaterally   Heart:  Regular rate and rhythm, no murmurs appreciated   Breasts:  Symmetrical, no masses, no lesions and no nipple discharge   Abdomen:   Soft, non-tender, non-distended and no organomegaly   Lymph nodes: No cervical, supraclavicular, inguinal or axillary adenopathy noted   Extremities: Normal, atraumatic; no clubbing, cyanosis, or edema    Skin: No rashes, ulcers, or suspicious lesions noted   Pelvic: External Genitalia  without lesions or skin changes  Vagina  is pink, moist, without lesions.  and previous prolapse has been surgically corrected, well supported in all areas now  Vaginal Cuff  Female Vaginal Cuff: smooth, intact and without visible lesions  Uterus  surgically absent  Ovaries  surgically absent bilaterally  Parametria  smooth  Rectovaginal  Female rectovaginal: deferred     ECOG score: 0             PHQ-9 Total Score:      Procedure Note:          Assessment and Plan:      Diagnoses and all orders for this visit:    1. Well woman exam with routine gynecological exam (Primary)    2. History of osteomyelitis    Other orders  -     estradiol (ESTRACE VAGINAL) 0.1 MG/GM vaginal cream; Insert 2 g into the vagina 3 (Three) Times a Week. Before bed.  Dispense: 42.5 g; Refill: 5         Pap was not done today.  I explained to Whit that the Pap smears are no longer recommended in low risk patients after hysterectomy.   I stressed to Whit that she still should be seen to be seen yearly for a full physical including breast and pelvic  exam.    She was encouraged to get yearly mammograms.  She should report any palpable breast lump(s) or skin changes regardless of mammographic findings.  I explained to Whit that notification regarding her mammogram results will come from the center performing the study.  Our office will not be routinely calling with mammogram results.  It is her responsibility to make sure that the results from the mammogram are communicated to her by the breast center.  If she has any questions about the results, she is welcome to call our office anytime.    Repeat colonoscopy in 2024 or sooner if new problems.     Repeat DEXA at age 59 unless new problems.     Vaginal estrogen refills provided. We reviewed medication instructions, risks, benefits, and potential side effects.     Pain assessment was performed today as a part of patient’s care. For patients with pain related to surgery, gynecologic malignancy or cancer treatment, the plan is as noted in the assessment/plan.  For patients with pain not related to these issues, they are to seek any further needed care from a more appropriate provider, such as PCP.      Follow-up:     Return to clinic in 1 year for Annual exam.      Electronically signed by ELI Lugo on 09/12/22 at 10:11 EDT

## 2023-01-03 ENCOUNTER — TRANSCRIBE ORDERS (OUTPATIENT)
Dept: ADMINISTRATIVE | Facility: HOSPITAL | Age: 58
End: 2023-01-03
Payer: COMMERCIAL

## 2023-01-03 DIAGNOSIS — Z12.31 VISIT FOR SCREENING MAMMOGRAM: Primary | ICD-10-CM

## 2023-02-06 ENCOUNTER — HOSPITAL ENCOUNTER (OUTPATIENT)
Dept: MAMMOGRAPHY | Facility: HOSPITAL | Age: 58
Discharge: HOME | End: 2023-02-06
Admitting: NURSE PRACTITIONER
Payer: COMMERCIAL

## 2023-02-06 DIAGNOSIS — Z12.31 VISIT FOR SCREENING MAMMOGRAM: ICD-10-CM

## 2023-02-06 PROCEDURE — 77063 BREAST TOMOSYNTHESIS BI: CPT | Performed by: RADIOLOGY

## 2023-02-06 PROCEDURE — 77067 SCR MAMMO BI INCL CAD: CPT

## 2023-02-06 PROCEDURE — 77067 SCR MAMMO BI INCL CAD: CPT | Performed by: RADIOLOGY

## 2023-02-06 PROCEDURE — 77063 BREAST TOMOSYNTHESIS BI: CPT

## 2023-10-26 ENCOUNTER — OFFICE VISIT (OUTPATIENT)
Dept: GYNECOLOGIC ONCOLOGY | Facility: CLINIC | Age: 58
End: 2023-10-26
Payer: COMMERCIAL

## 2023-10-26 VITALS
RESPIRATION RATE: 18 BRPM | HEIGHT: 68 IN | BODY MASS INDEX: 31.42 KG/M2 | WEIGHT: 207.3 LBS | SYSTOLIC BLOOD PRESSURE: 138 MMHG | TEMPERATURE: 97.5 F | HEART RATE: 68 BPM | DIASTOLIC BLOOD PRESSURE: 81 MMHG | OXYGEN SATURATION: 98 %

## 2023-10-26 DIAGNOSIS — Z01.419 WELL WOMAN EXAM WITH ROUTINE GYNECOLOGICAL EXAM: Primary | ICD-10-CM

## 2023-10-26 PROCEDURE — 99396 PREV VISIT EST AGE 40-64: CPT | Performed by: NURSE PRACTITIONER

## 2023-10-26 RX ORDER — ESTRADIOL 0.1 MG/G
2 CREAM VAGINAL 3 TIMES WEEKLY
Qty: 42.5 G | Refills: 5 | Status: SHIPPED | OUTPATIENT
Start: 2023-10-27

## 2023-10-26 NOTE — PROGRESS NOTES
GYN ONCOLOGY ANNUAL WELL WOMAN VISIT      Whit Davis  [id number]  1965      Subjective   Chief Complaint: Annual Exam (No gyn complaints)        History of present illness:      Whit Davis is a 58 y.o. year old female who is here today for an annual exam. History significant for L5S1 osteomyelitis following hysterectomy/ASC by Dr. Rosas in . Following her infection, she began management with Dr. Salazar [facility]. She subsequently underwent uncomplicated rectocele repair by him in . She has continued routine gynecologic care with our office since that time.  She underwent urogynecologic repair with Dr. Payan at  in 2021. Patient is very pleased with her outcomes and care.     Whit reports she is feeling very well today and has no complaints. She denies vaginal bleeding, pelvic pain, changes in bowel or bladder function, new or concerning lesions, and breast problems. She is happy with her vaginal Estrace cream, requests refills.         Obstetric History:  OB History          2    Para   2    Term   2            AB        Living             SAB        IAB        Ectopic        Molar        Multiple        Live Births                   Menstrual History:     No LMP recorded. Patient has had a hysterectomy.          The current medication list and allergy list were reviewed and reconciled.     Past Medical History, Past Surgical History, Social History, Family History have been reviewed and are without significant changes except as mentioned.    Health Maintenance:  Last mammogram was 2023. Last colonoscopy was 2019, with recommended follow-up in 5 year(s).  Last pap smear was , results were normal PAP. She  does not have a history of abnormal pap smears.      Review of Systems   Constitutional: Negative.    Gastrointestinal: Negative.    Genitourinary: Negative.    Psychiatric/Behavioral: Negative.             Objective   Physical Exam  Vital  "Signs: /81   Pulse 68   Temp 97.5 °F (36.4 °C) (Temporal)   Resp 18   Ht 172.7 cm (68\")   Wt 94 kg (207 lb 4.8 oz)   SpO2 98%   BMI 31.52 kg/m²   Vitals:    10/26/23 1021   PainSc: 0-No pain           General Appearance:  alert, cooperative, no apparent distress and appears stated age   Neurologic/Psych: A&O x 3, gait steady, appropriate affect   Lungs:   Clear to auscultation bilaterally; respirations regular, even, and unlabored bilaterally   Heart:  Regular rate and rhythm, no murmurs appreciated   Breasts:  Symmetrical, no masses, no lesions and no nipple discharge   Abdomen:   Soft, non-tender, non-distended and no organomegaly   Lymph nodes: No cervical, supraclavicular, inguinal or axillary adenopathy noted   Extremities: Normal, atraumatic; no clubbing, cyanosis, or edema    Skin: No rashes, ulcers, or suspicious lesions noted   Pelvic: External Genitalia  without lesions or skin changes  Vagina  is pink, moist, without lesions.  and previous prolapse has been surgically corrected, well supported in all areas   Vaginal Cuff  Female Vaginal Cuff: smooth, intact and without visible lesions  Uterus  surgically absent  Ovaries  surgically absent bilaterally  Parametria  smooth  Rectovaginal  Female rectovaginal: deferred     ECOG score: 0             PHQ-9 Total Score:      Procedure Note:          Assessment and Plan:      Diagnoses and all orders for this visit:    1. Well woman exam with routine gynecological exam (Primary)    Other orders  -     estradiol (ESTRACE VAGINAL) 0.1 MG/GM vaginal cream; Insert 2 g into the vagina 3 (Three) Times a Week. Before bed.  Dispense: 42.5 g; Refill: 5           Pap was not done today.  I explained to Whit that the Pap smears are no longer recommended in low risk patients after hysterectomy.   I stressed to Whit that she still should be seen to be seen yearly for a full physical including breast and pelvic exam.    She was encouraged to get yearly " mammograms.  She should report any palpable breast lump(s) or skin changes regardless of mammographic findings.  I explained to Whit that notification regarding her mammogram results will come from the center performing the study.  Our office will not be routinely calling with mammogram results.  It is her responsibility to make sure that the results from the mammogram are communicated to her by the breast center.  If she has any questions about the results, she is welcome to call our office anytime.    Repeat colonoscopy in 2024 or sooner if new problems.     Repeat DEXA at age 59 unless new problems.     Vaginal estrogen refills provided. We reviewed medication instructions, risks, benefits, and potential side effects.     Pain assessment was performed today as a part of patient’s care. For patients with pain related to surgery, gynecologic malignancy or cancer treatment, the plan is as noted in the assessment/plan.  For patients with pain not related to these issues, they are to seek any further needed care from a more appropriate provider, such as PCP.      Follow-up:     Return to clinic in 1 year for Annual exam.      Electronically signed by ELI Lugo on 10/26/23 at 10:55 EDT

## 2024-01-09 ENCOUNTER — TRANSCRIBE ORDERS (OUTPATIENT)
Dept: ADMINISTRATIVE | Facility: HOSPITAL | Age: 59
End: 2024-01-09
Payer: COMMERCIAL

## 2024-01-09 DIAGNOSIS — Z12.31 VISIT FOR SCREENING MAMMOGRAM: Primary | ICD-10-CM

## 2024-02-21 ENCOUNTER — HOSPITAL ENCOUNTER (OUTPATIENT)
Dept: MAMMOGRAPHY | Facility: HOSPITAL | Age: 59
Discharge: HOME | End: 2024-02-21
Admitting: NURSE PRACTITIONER
Payer: COMMERCIAL

## 2024-02-21 DIAGNOSIS — Z12.31 VISIT FOR SCREENING MAMMOGRAM: ICD-10-CM

## 2024-02-21 PROCEDURE — 77067 SCR MAMMO BI INCL CAD: CPT

## 2024-02-21 PROCEDURE — 77063 BREAST TOMOSYNTHESIS BI: CPT

## 2024-10-28 ENCOUNTER — OFFICE VISIT (OUTPATIENT)
Dept: GYNECOLOGIC ONCOLOGY | Facility: CLINIC | Age: 59
End: 2024-10-28
Payer: COMMERCIAL

## 2024-10-28 VITALS
SYSTOLIC BLOOD PRESSURE: 130 MMHG | TEMPERATURE: 98 F | DIASTOLIC BLOOD PRESSURE: 74 MMHG | BODY MASS INDEX: 28.54 KG/M2 | WEIGHT: 188.3 LBS | HEART RATE: 54 BPM | HEIGHT: 68 IN | RESPIRATION RATE: 18 BRPM | OXYGEN SATURATION: 100 %

## 2024-10-28 DIAGNOSIS — Z12.31 ENCOUNTER FOR SCREENING MAMMOGRAM FOR MALIGNANT NEOPLASM OF BREAST: ICD-10-CM

## 2024-10-28 DIAGNOSIS — Z01.419 WELL WOMAN EXAM WITH ROUTINE GYNECOLOGICAL EXAM: Primary | ICD-10-CM

## 2024-10-28 RX ORDER — ESTRADIOL 0.1 MG/G
2 CREAM VAGINAL 3 TIMES WEEKLY
Qty: 42.5 G | Refills: 5 | Status: SHIPPED | OUTPATIENT
Start: 2024-10-28

## 2024-10-28 NOTE — PROGRESS NOTES
GYN ONCOLOGY ANNUAL WELL WOMAN VISIT      Whit Davis  [id number]  1965      Subjective   Chief Complaint: Annual Exam      History of present illness:    Whit Davis is a 59 y.o. year old female who is here today for an annual exam. History significant for L5S1 osteomyelitis following hysterectomy/ASC by Dr. Rosas in . Following her infection, she began management with Dr. Salazar of [facility]. She subsequently underwent uncomplicated rectocele repair by him in . She has continued routine gynecologic care with our office since that time.  No gynecologic oncology history. She underwent urogynecologic repair with Dr. Payan at  in 2021.  Reports that they informed her she can now follow-up with him PRN. patient is very pleased with her outcomes and care.      Whit reports she is feeling very well today and has no complaints.  Together, she and her partner have 5 daughters, 3 of which are all getting  within a one month time period right now.  She denies vaginal bleeding, pelvic pain, changes in bowel or bladder function, new or concerning lesions, and breast problems. She is happy with her vaginal Estrace cream, requests refills. See health maintenance below for update on WW screenings.     Cancer History:   Oncology/Hematology History    No history exists.       Obstetric History:  OB History          2    Para   2    Term   2            AB        Living             SAB        IAB        Ectopic        Molar        Multiple        Live Births   2               Menstrual History:     No LMP recorded. Patient has had a hysterectomy.          The current medication list and allergy list were reviewed and reconciled.     Past Medical History, Past Surgical History, Social History, Family History have been reviewed and are without significant changes except as mentioned.    Health Maintenance:  see EMR.  -Last mammogram: 2024, birads1  -Last  "colonoscopy: by Dr Perea @[facility]. Mom and dad both had colon issues, so she started screening early. Does not recall exactly when due, but not for a couple years.   -Last DEXA: She has an order by rheumatologist, plans to complete in the near future  -Last pap smear: 7-  -History of abnormal paps: no      Review of Systems   Constitutional: Negative.    Gastrointestinal: Negative.    Genitourinary: Negative.         +reports decreased sensation in vagina, otherwise negative  ROS   Psychiatric/Behavioral: Negative.           Objective   Physical Exam  Vital Signs: /74   Pulse 54   Temp 98 °F (36.7 °C) (Temporal)   Resp 18   Ht 172.7 cm (68\")   Wt 85.4 kg (188 lb 4.8 oz)   SpO2 100%   BMI 28.63 kg/m²   Vitals:    10/28/24 1007   PainSc: 0-No pain           General Appearance:  alert, cooperative, no apparent distress and appears stated age   Neurologic/Psych: A&O x 3, gait steady, appropriate affect   Lungs:   Clear to auscultation bilaterally; respirations regular, even, and unlabored bilaterally   Heart:  Regular rate and rhythm, no murmurs appreciated   Breasts:  Symmetrical, no masses, no lesions, and no nipple discharge   Abdomen:   Soft, non-tender, non-distended, and no organomegaly   Lymph nodes: No cervical, supraclavicular, inguinal or axillary adenopathy noted   Extremities: Normal, atraumatic; no clubbing, cyanosis, or edema    Skin: No rashes, ulcers, or suspicious lesions noted   Pelvic: External Genitalia  without lesions or skin changes  Vagina  is pink, moist, without lesions.  and previous prolapse has been surgically corrected, well supported in all areas   Vaginal Cuff  Female Vaginal Cuff: smooth, intact and without visible lesions  Uterus  surgically absent  Ovaries  surgically absent bilaterally  Parametria  smooth  Rectovaginal  Female rectovaginal: deferred     ECOG score: 0             Result Review :     Tumor marker:  No results found for: \"\"    PHQ-9 Total Score:  "     Procedure Note:          Assessment and Plan:      -Pap completed today. Discussed that if negative, we will plan for this to be her last one. She is low risk and without h/o abnormal pap smears/ HPV and s/p hysterectomy.     -Next mmg due 2/2025, orders placed    -DEXA per rheumatology    -Repeat colonoscopy per GI, Dr Perea @[facility]    -Continue vaginal estrogen as prescribed, refills provided. We reviewed medication instructions, risks, benefits, and potential side effects.     Diagnoses and all orders for this visit:    1. Well woman exam with routine gynecological exam (Primary)  -     LIQUID-BASED PAP SMEAR WITH HPV GENOTYPING REGARDLESS OF INTERPRETATION (JENNIFFER,COR,MAD); Future    2. Encounter for screening mammogram for malignant neoplasm of breast  -     Mammo Screening Digital Tomosynthesis Bilateral With CAD; Future    Other orders  -     estradiol (ESTRACE VAGINAL) 0.1 MG/GM vaginal cream; Insert 2 g into the vagina 3 (Three) Times a Week. Before bed.  Dispense: 42.5 g; Refill: 5      Pain assessment was performed today as a part of patient’s care. For patients with pain related to surgery, gynecologic malignancy or cancer treatment, the plan is as noted in the assessment/plan.  For patients with pain not related to these issues, they are to seek any further needed care from a more appropriate provider, such as PCP.    Follow-up:   Return to clinic in 1 year for Annual exam.      Electronically signed by ELI Ferreira on 10/28/2024

## 2024-10-30 LAB — REF LAB TEST METHOD: NORMAL

## 2024-11-05 ENCOUNTER — TELEPHONE (OUTPATIENT)
Dept: GYNECOLOGIC ONCOLOGY | Facility: CLINIC | Age: 59
End: 2024-11-05
Payer: COMMERCIAL

## 2024-11-05 NOTE — TELEPHONE ENCOUNTER
----- Message from Rita White sent at 11/5/2024  4:15 PM EST -----  Please let Whit know pap smear shows normal results.

## 2024-11-05 NOTE — TELEPHONE ENCOUNTER
RN called patient per APRN request.  Informed her of normal pap smear results.  Patient appreciative of call.

## 2025-03-01 LAB
NCCN CRITERIA FLAG: NORMAL
TYRER CUZICK SCORE: 7

## 2025-03-06 ENCOUNTER — HOSPITAL ENCOUNTER (OUTPATIENT)
Dept: MAMMOGRAPHY | Facility: HOSPITAL | Age: 60
Discharge: HOME | End: 2025-03-06
Admitting: NURSE PRACTITIONER
Payer: COMMERCIAL

## 2025-03-06 DIAGNOSIS — Z12.31 ENCOUNTER FOR SCREENING MAMMOGRAM FOR MALIGNANT NEOPLASM OF BREAST: ICD-10-CM

## 2025-03-06 PROCEDURE — 77063 BREAST TOMOSYNTHESIS BI: CPT

## 2025-03-06 PROCEDURE — 77067 SCR MAMMO BI INCL CAD: CPT

## 2025-03-18 ENCOUNTER — HOSPITAL ENCOUNTER (OUTPATIENT)
Facility: HOSPITAL | Age: 60
Discharge: HOME | End: 2025-03-18
Payer: COMMERCIAL

## 2025-03-18 DIAGNOSIS — R92.8 ABNORMAL MAMMOGRAM: ICD-10-CM

## 2025-03-18 PROCEDURE — 76642 ULTRASOUND BREAST LIMITED: CPT

## 2025-03-18 PROCEDURE — 77065 DX MAMMO INCL CAD UNI: CPT | Performed by: RADIOLOGY

## 2025-03-18 PROCEDURE — 76642 ULTRASOUND BREAST LIMITED: CPT | Performed by: RADIOLOGY

## 2025-03-18 PROCEDURE — 77061 BREAST TOMOSYNTHESIS UNI: CPT | Performed by: RADIOLOGY

## 2025-03-18 PROCEDURE — G0279 TOMOSYNTHESIS, MAMMO: HCPCS

## 2025-03-18 PROCEDURE — 77065 DX MAMMO INCL CAD UNI: CPT

## (undated) DEVICE — GLV SURG SENSICARE MICRO PF LF 8.5 STRL

## (undated) DEVICE — LEX VAGINAL HYSTERECTOMY: Brand: MEDLINE INDUSTRIES, INC.

## (undated) DEVICE — INTENDED FOR TISSUE SEPARATION, AND OTHER PROCEDURES THAT REQUIRE A SHARP SURGICAL BLADE TO PUNCTURE OR CUT.: Brand: BARD-PARKER ® STAINLESS STEEL BLADES

## (undated) DEVICE — ANTIBACTERIAL UNDYED BRAIDED (POLYGLACTIN 910), SYNTHETIC ABSORBABLE SUTURE: Brand: COATED VICRYL

## (undated) DEVICE — NDL SPINE 22G 31/2IN BLK

## (undated) DEVICE — LEGGINGS, PAIR, 29X43, STERILE: Brand: MEDLINE

## (undated) DEVICE — ST. VAGINAL PACKING X-RAY DETECTABLE: Brand: DEROYAL

## (undated) DEVICE — SOL LR 1000ML

## (undated) DEVICE — SHEET, DRAPE, SPLIT, STERILE: Brand: MEDLINE

## (undated) DEVICE — CANNULA,ADULT,SOFT-TOUCH,7TUBE,SC: Brand: MEDLINE

## (undated) DEVICE — TBG VITL VUE XLNG

## (undated) DEVICE — SUT VIC 2/0 CT2 27IN J269H

## (undated) DEVICE — GLV SURG SENSICARE MICRO PF LF 8 STRL